# Patient Record
Sex: MALE | Race: BLACK OR AFRICAN AMERICAN | NOT HISPANIC OR LATINO | Employment: FULL TIME | URBAN - METROPOLITAN AREA
[De-identification: names, ages, dates, MRNs, and addresses within clinical notes are randomized per-mention and may not be internally consistent; named-entity substitution may affect disease eponyms.]

---

## 2023-01-21 ENCOUNTER — ANESTHESIA (OUTPATIENT)
Dept: SURGERY | Facility: HOSPITAL | Age: 28
DRG: 481 | End: 2023-01-21
Payer: COMMERCIAL

## 2023-01-21 ENCOUNTER — ANESTHESIA EVENT (OUTPATIENT)
Dept: SURGERY | Facility: HOSPITAL | Age: 28
DRG: 481 | End: 2023-01-21
Payer: COMMERCIAL

## 2023-01-21 ENCOUNTER — HOSPITAL ENCOUNTER (INPATIENT)
Facility: HOSPITAL | Age: 28
LOS: 3 days | Discharge: HOME | DRG: 481 | End: 2023-01-24
Attending: STUDENT IN AN ORGANIZED HEALTH CARE EDUCATION/TRAINING PROGRAM | Admitting: ORTHOPAEDIC SURGERY
Payer: COMMERCIAL
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
## 2023-01-21 DIAGNOSIS — S82.832A CLOSED FRACTURE OF DISTAL END OF LEFT FIBULA, UNSPECIFIED FRACTURE MORPHOLOGY, INITIAL ENCOUNTER: ICD-10-CM

## 2023-01-21 DIAGNOSIS — S82.425A CLOSED NONDISPLACED TRANSVERSE FRACTURE OF SHAFT OF LEFT FIBULA, INITIAL ENCOUNTER: ICD-10-CM

## 2023-01-21 DIAGNOSIS — S82.022B: ICD-10-CM

## 2023-01-21 DIAGNOSIS — Z78.9 ALCOHOL USE: ICD-10-CM

## 2023-01-21 DIAGNOSIS — S72.352A CLOSED DISPLACED COMMINUTED FRACTURE OF SHAFT OF LEFT FEMUR, INITIAL ENCOUNTER: ICD-10-CM

## 2023-01-21 LAB
ABORH RETYPE: NORMAL
ALBUMIN SERPL-MCNC: 3.9 G/DL (ref 3.5–5)
ALBUMIN/GLOB SERPL: 0.9 RATIO (ref 1.1–2)
ALP SERPL-CCNC: 82 UNIT/L (ref 40–150)
ALT SERPL-CCNC: 15 UNIT/L (ref 0–55)
AMPHET UR QL SCN: NEGATIVE
APPEARANCE UR: CLEAR
APTT PPP: 25.8 SECONDS (ref 23.2–33.7)
AST SERPL-CCNC: 31 UNIT/L (ref 5–34)
BACTERIA #/AREA URNS AUTO: NORMAL /HPF
BARBITURATE SCN PRESENT UR: NEGATIVE
BASOPHILS # BLD AUTO: 0.04 X10(3)/MCL (ref 0–0.2)
BASOPHILS NFR BLD AUTO: 0.3 %
BENZODIAZ UR QL SCN: NEGATIVE
BILIRUB UR QL STRIP.AUTO: NEGATIVE MG/DL
BILIRUBIN DIRECT+TOT PNL SERPL-MCNC: 1.5 MG/DL
BUN SERPL-MCNC: 11.1 MG/DL (ref 8.9–20.6)
CALCIUM SERPL-MCNC: 9.3 MG/DL (ref 8.4–10.2)
CANNABINOIDS UR QL SCN: POSITIVE
CHLORIDE SERPL-SCNC: 102 MMOL/L (ref 98–107)
CO2 SERPL-SCNC: 19 MMOL/L (ref 22–29)
COCAINE UR QL SCN: NEGATIVE
COLOR UR AUTO: YELLOW
CREAT SERPL-MCNC: 0.91 MG/DL (ref 0.73–1.18)
EOSINOPHIL # BLD AUTO: 0.02 X10(3)/MCL (ref 0–0.9)
EOSINOPHIL NFR BLD AUTO: 0.1 %
ERYTHROCYTE [DISTWIDTH] IN BLOOD BY AUTOMATED COUNT: 14 % (ref 11.5–17)
ETHANOL SERPL-MCNC: 204 MG/DL
FENTANYL UR QL SCN: POSITIVE
GFR SERPLBLD CREATININE-BSD FMLA CKD-EPI: >60 MLS/MIN/1.73/M2
GLOBULIN SER-MCNC: 4.2 GM/DL (ref 2.4–3.5)
GLUCOSE SERPL-MCNC: 92 MG/DL (ref 74–100)
GLUCOSE UR QL STRIP.AUTO: NEGATIVE MG/DL
GROUP & RH: NORMAL
HCT VFR BLD AUTO: 42.5 % (ref 42–52)
HGB BLD-MCNC: 13.7 GM/DL (ref 14–18)
IMM GRANULOCYTES # BLD AUTO: 0.07 X10(3)/MCL (ref 0–0.04)
IMM GRANULOCYTES NFR BLD AUTO: 0.5 %
INDIRECT COOMBS GEL: NORMAL
INR BLD: 1.02 (ref 0–1.3)
KETONES UR QL STRIP.AUTO: NEGATIVE MG/DL
LACTATE SERPL-SCNC: 2.2 MMOL/L (ref 0.5–2.2)
LACTATE SERPL-SCNC: 2.7 MMOL/L (ref 0.5–2.2)
LACTATE SERPL-SCNC: 4.1 MMOL/L (ref 0.5–2.2)
LEUKOCYTE ESTERASE UR QL STRIP.AUTO: NEGATIVE UNIT/L
LYMPHOCYTES # BLD AUTO: 2.17 X10(3)/MCL (ref 0.6–4.6)
LYMPHOCYTES NFR BLD AUTO: 14.6 %
MCH RBC QN AUTO: 27.9 PG
MCHC RBC AUTO-ENTMCNC: 32.2 MG/DL (ref 33–36)
MCV RBC AUTO: 86.6 FL (ref 80–94)
MDMA UR QL SCN: NEGATIVE
MONOCYTES # BLD AUTO: 1.06 X10(3)/MCL (ref 0.1–1.3)
MONOCYTES NFR BLD AUTO: 7.1 %
NEUTROPHILS # BLD AUTO: 11.51 X10(3)/MCL (ref 2.1–9.2)
NEUTROPHILS NFR BLD AUTO: 77.4 %
NITRITE UR QL STRIP.AUTO: NEGATIVE
NRBC BLD AUTO-RTO: 0 %
OPIATES UR QL SCN: POSITIVE
PCP UR QL: NEGATIVE
PH UR STRIP.AUTO: 5.5 [PH]
PH UR: 5.5 [PH] (ref 3–11)
PLATELET # BLD AUTO: 260 X10(3)/MCL (ref 130–400)
PMV BLD AUTO: 9.9 FL (ref 7.4–10.4)
POTASSIUM SERPL-SCNC: 3.8 MMOL/L (ref 3.5–5.1)
PROT SERPL-MCNC: 8.1 GM/DL (ref 6.4–8.3)
PROT UR QL STRIP.AUTO: NEGATIVE MG/DL
PROTHROMBIN TIME: 13.3 SECONDS (ref 12.5–14.5)
RBC # BLD AUTO: 4.91 X10(6)/MCL (ref 4.7–6.1)
RBC #/AREA URNS AUTO: <5 /HPF
RBC UR QL AUTO: ABNORMAL UNIT/L
SODIUM SERPL-SCNC: 138 MMOL/L (ref 136–145)
SP GR UR STRIP.AUTO: 1.03 (ref 1–1.03)
SPECIMEN OUTDATE: NORMAL
SQUAMOUS #/AREA URNS AUTO: <5 /HPF
UROBILINOGEN UR STRIP-ACNC: 0.2 MG/DL
WBC # SPEC AUTO: 14.9 X10(3)/MCL (ref 4.5–11.5)
WBC #/AREA URNS AUTO: <5 /HPF

## 2023-01-21 PROCEDURE — 37000008 HC ANESTHESIA 1ST 15 MINUTES: Performed by: ORTHOPAEDIC SURGERY

## 2023-01-21 PROCEDURE — 37000009 HC ANESTHESIA EA ADD 15 MINS: Performed by: ORTHOPAEDIC SURGERY

## 2023-01-21 PROCEDURE — 99223 1ST HOSP IP/OBS HIGH 75: CPT | Mod: 57,,, | Performed by: ORTHOPAEDIC SURGERY

## 2023-01-21 PROCEDURE — 36000710: Performed by: ORTHOPAEDIC SURGERY

## 2023-01-21 PROCEDURE — 11012 PR DEBRIDE ASSOC OPEN FX/DISLO SKIN/MUS/BONE: ICD-10-PCS | Mod: 51,,, | Performed by: ORTHOPAEDIC SURGERY

## 2023-01-21 PROCEDURE — 90715 TDAP VACCINE 7 YRS/> IM: CPT | Performed by: STUDENT IN AN ORGANIZED HEALTH CARE EDUCATION/TRAINING PROGRAM

## 2023-01-21 PROCEDURE — 86900 BLOOD TYPING SEROLOGIC ABO: CPT | Performed by: STUDENT IN AN ORGANIZED HEALTH CARE EDUCATION/TRAINING PROGRAM

## 2023-01-21 PROCEDURE — 85730 THROMBOPLASTIN TIME PARTIAL: CPT | Performed by: STUDENT IN AN ORGANIZED HEALTH CARE EDUCATION/TRAINING PROGRAM

## 2023-01-21 PROCEDURE — 63600175 PHARM REV CODE 636 W HCPCS

## 2023-01-21 PROCEDURE — 99223 PR INITIAL HOSPITAL CARE,LEVL III: ICD-10-PCS | Mod: 57,,, | Performed by: ORTHOPAEDIC SURGERY

## 2023-01-21 PROCEDURE — 27524 PR OPEN RX PATELLA FX: ICD-10-PCS | Mod: AS,51,LT, | Performed by: NURSE PRACTITIONER

## 2023-01-21 PROCEDURE — 80307 DRUG TEST PRSMV CHEM ANLYZR: CPT | Performed by: STUDENT IN AN ORGANIZED HEALTH CARE EDUCATION/TRAINING PROGRAM

## 2023-01-21 PROCEDURE — 27506 PR OPEN RX FEMUR FX+INTRAMED ROD: ICD-10-PCS | Mod: AS,LT,, | Performed by: NURSE PRACTITIONER

## 2023-01-21 PROCEDURE — C1713 ANCHOR/SCREW BN/BN,TIS/BN: HCPCS | Performed by: ORTHOPAEDIC SURGERY

## 2023-01-21 PROCEDURE — 25000003 PHARM REV CODE 250: Performed by: NURSE PRACTITIONER

## 2023-01-21 PROCEDURE — 85025 COMPLETE CBC W/AUTO DIFF WBC: CPT | Performed by: STUDENT IN AN ORGANIZED HEALTH CARE EDUCATION/TRAINING PROGRAM

## 2023-01-21 PROCEDURE — G0390 TRAUMA RESPONS W/HOSP CRITI: HCPCS

## 2023-01-21 PROCEDURE — 36000711: Performed by: ORTHOPAEDIC SURGERY

## 2023-01-21 PROCEDURE — C1769 GUIDE WIRE: HCPCS | Performed by: ORTHOPAEDIC SURGERY

## 2023-01-21 PROCEDURE — 90471 IMMUNIZATION ADMIN: CPT | Performed by: STUDENT IN AN ORGANIZED HEALTH CARE EDUCATION/TRAINING PROGRAM

## 2023-01-21 PROCEDURE — 82077 ASSAY SPEC XCP UR&BREATH IA: CPT | Performed by: STUDENT IN AN ORGANIZED HEALTH CARE EDUCATION/TRAINING PROGRAM

## 2023-01-21 PROCEDURE — 63600175 PHARM REV CODE 636 W HCPCS: Performed by: NURSE ANESTHETIST, CERTIFIED REGISTERED

## 2023-01-21 PROCEDURE — 27524 TREAT KNEECAP FRACTURE: CPT | Mod: AS,51,LT, | Performed by: NURSE PRACTITIONER

## 2023-01-21 PROCEDURE — 27524 TREAT KNEECAP FRACTURE: CPT | Mod: 51,LT,, | Performed by: ORTHOPAEDIC SURGERY

## 2023-01-21 PROCEDURE — 27506 TREATMENT OF THIGH FRACTURE: CPT | Mod: AS,LT,, | Performed by: NURSE PRACTITIONER

## 2023-01-21 PROCEDURE — 25000003 PHARM REV CODE 250: Performed by: STUDENT IN AN ORGANIZED HEALTH CARE EDUCATION/TRAINING PROGRAM

## 2023-01-21 PROCEDURE — 27506 TREATMENT OF THIGH FRACTURE: CPT | Mod: LT,,, | Performed by: ORTHOPAEDIC SURGERY

## 2023-01-21 PROCEDURE — 81001 URINALYSIS AUTO W/SCOPE: CPT | Performed by: STUDENT IN AN ORGANIZED HEALTH CARE EDUCATION/TRAINING PROGRAM

## 2023-01-21 PROCEDURE — 25500020 PHARM REV CODE 255: Performed by: STUDENT IN AN ORGANIZED HEALTH CARE EDUCATION/TRAINING PROGRAM

## 2023-01-21 PROCEDURE — 63600175 PHARM REV CODE 636 W HCPCS: Performed by: STUDENT IN AN ORGANIZED HEALTH CARE EDUCATION/TRAINING PROGRAM

## 2023-01-21 PROCEDURE — 25000003 PHARM REV CODE 250: Performed by: NURSE ANESTHETIST, CERTIFIED REGISTERED

## 2023-01-21 PROCEDURE — 85610 PROTHROMBIN TIME: CPT | Performed by: STUDENT IN AN ORGANIZED HEALTH CARE EDUCATION/TRAINING PROGRAM

## 2023-01-21 PROCEDURE — 71000039 HC RECOVERY, EACH ADD'L HOUR: Performed by: ORTHOPAEDIC SURGERY

## 2023-01-21 PROCEDURE — 83605 ASSAY OF LACTIC ACID: CPT | Performed by: STUDENT IN AN ORGANIZED HEALTH CARE EDUCATION/TRAINING PROGRAM

## 2023-01-21 PROCEDURE — 94799 UNLISTED PULMONARY SVC/PX: CPT

## 2023-01-21 PROCEDURE — 27201423 OPTIME MED/SURG SUP & DEVICES STERILE SUPPLY: Performed by: ORTHOPAEDIC SURGERY

## 2023-01-21 PROCEDURE — 27506 PR OPEN RX FEMUR FX+INTRAMED ROD: ICD-10-PCS | Mod: LT,,, | Performed by: ORTHOPAEDIC SURGERY

## 2023-01-21 PROCEDURE — 80053 COMPREHEN METABOLIC PANEL: CPT | Performed by: STUDENT IN AN ORGANIZED HEALTH CARE EDUCATION/TRAINING PROGRAM

## 2023-01-21 PROCEDURE — 63600175 PHARM REV CODE 636 W HCPCS: Performed by: ORTHOPAEDIC SURGERY

## 2023-01-21 PROCEDURE — 96375 TX/PRO/DX INJ NEW DRUG ADDON: CPT

## 2023-01-21 PROCEDURE — 71000033 HC RECOVERY, INTIAL HOUR: Performed by: ORTHOPAEDIC SURGERY

## 2023-01-21 PROCEDURE — 96374 THER/PROPH/DIAG INJ IV PUSH: CPT

## 2023-01-21 PROCEDURE — 27524 PR OPEN RX PATELLA FX: ICD-10-PCS | Mod: 51,LT,, | Performed by: ORTHOPAEDIC SURGERY

## 2023-01-21 PROCEDURE — 63600175 PHARM REV CODE 636 W HCPCS: Performed by: ANESTHESIOLOGY

## 2023-01-21 PROCEDURE — 63600175 PHARM REV CODE 636 W HCPCS: Performed by: NURSE PRACTITIONER

## 2023-01-21 PROCEDURE — 99285 EMERGENCY DEPT VISIT HI MDM: CPT | Mod: 25

## 2023-01-21 PROCEDURE — 11012 DEB SKIN BONE AT FX SITE: CPT | Mod: 51,,, | Performed by: ORTHOPAEDIC SURGERY

## 2023-01-21 PROCEDURE — 11000001 HC ACUTE MED/SURG PRIVATE ROOM

## 2023-01-21 DEVICE — IMPLANTABLE DEVICE: Type: IMPLANTABLE DEVICE | Site: FEMUR | Status: FUNCTIONAL

## 2023-01-21 DEVICE — SCREW LOCKING BONE T2 5X47MM: Type: IMPLANTABLE DEVICE | Site: FEMUR | Status: FUNCTIONAL

## 2023-01-21 DEVICE — SCREW LOCKING BONE T2 5X42MM: Type: IMPLANTABLE DEVICE | Site: FEMUR | Status: FUNCTIONAL

## 2023-01-21 RX ORDER — MORPHINE SULFATE 4 MG/ML
2 INJECTION, SOLUTION INTRAMUSCULAR; INTRAVENOUS EVERY 4 HOURS PRN
Status: DISCONTINUED | OUTPATIENT
Start: 2023-01-21 | End: 2023-01-24 | Stop reason: HOSPADM

## 2023-01-21 RX ORDER — MIDAZOLAM HYDROCHLORIDE 1 MG/ML
INJECTION INTRAMUSCULAR; INTRAVENOUS
Status: DISCONTINUED | OUTPATIENT
Start: 2023-01-21 | End: 2023-01-21

## 2023-01-21 RX ORDER — ROCURONIUM BROMIDE 10 MG/ML
INJECTION, SOLUTION INTRAVENOUS
Status: DISCONTINUED | OUTPATIENT
Start: 2023-01-21 | End: 2023-01-21

## 2023-01-21 RX ORDER — PROPOFOL 10 MG/ML
VIAL (ML) INTRAVENOUS CODE/TRAUMA/SEDATION MEDICATION
Status: DISCONTINUED | OUTPATIENT
Start: 2023-01-21 | End: 2023-01-24 | Stop reason: HOSPADM

## 2023-01-21 RX ORDER — MAG HYDROX/ALUMINUM HYD/SIMETH 200-200-20
30 SUSPENSION, ORAL (FINAL DOSE FORM) ORAL EVERY 6 HOURS PRN
Status: DISCONTINUED | OUTPATIENT
Start: 2023-01-21 | End: 2023-01-24 | Stop reason: HOSPADM

## 2023-01-21 RX ORDER — PROPOFOL 10 MG/ML
VIAL (ML) INTRAVENOUS
Status: DISCONTINUED | OUTPATIENT
Start: 2023-01-21 | End: 2023-01-21

## 2023-01-21 RX ORDER — DIPHENHYDRAMINE HYDROCHLORIDE 50 MG/ML
25 INJECTION INTRAMUSCULAR; INTRAVENOUS EVERY 6 HOURS PRN
Status: DISCONTINUED | OUTPATIENT
Start: 2023-01-21 | End: 2023-01-22

## 2023-01-21 RX ORDER — SODIUM CHLORIDE, SODIUM LACTATE, POTASSIUM CHLORIDE, CALCIUM CHLORIDE 600; 310; 30; 20 MG/100ML; MG/100ML; MG/100ML; MG/100ML
INJECTION, SOLUTION INTRAVENOUS
Status: DISCONTINUED | OUTPATIENT
Start: 2023-01-21 | End: 2023-01-24 | Stop reason: HOSPADM

## 2023-01-21 RX ORDER — ONDANSETRON 2 MG/ML
INJECTION INTRAMUSCULAR; INTRAVENOUS
Status: COMPLETED
Start: 2023-01-21 | End: 2023-01-21

## 2023-01-21 RX ORDER — ONDANSETRON 2 MG/ML
4 INJECTION INTRAMUSCULAR; INTRAVENOUS ONCE
Status: DISCONTINUED | OUTPATIENT
Start: 2023-01-21 | End: 2023-01-24 | Stop reason: HOSPADM

## 2023-01-21 RX ORDER — CEFAZOLIN SODIUM 1 G/3ML
INJECTION, POWDER, FOR SOLUTION INTRAMUSCULAR; INTRAVENOUS
Status: DISCONTINUED | OUTPATIENT
Start: 2023-01-21 | End: 2023-01-21

## 2023-01-21 RX ORDER — METHOCARBAMOL 100 MG/ML
1000 INJECTION, SOLUTION INTRAMUSCULAR; INTRAVENOUS
Status: COMPLETED | OUTPATIENT
Start: 2023-01-21 | End: 2023-01-21

## 2023-01-21 RX ORDER — CEFAZOLIN SODIUM 1 G/3ML
INJECTION, POWDER, FOR SOLUTION INTRAMUSCULAR; INTRAVENOUS
Status: COMPLETED
Start: 2023-01-21 | End: 2023-01-21

## 2023-01-21 RX ORDER — MORPHINE SULFATE 4 MG/ML
4 INJECTION, SOLUTION INTRAMUSCULAR; INTRAVENOUS EVERY 4 HOURS PRN
Status: DISCONTINUED | OUTPATIENT
Start: 2023-01-21 | End: 2023-01-24 | Stop reason: HOSPADM

## 2023-01-21 RX ORDER — ONDANSETRON HYDROCHLORIDE 4 MG/5ML
4 SOLUTION ORAL EVERY 6 HOURS PRN
Status: DISCONTINUED | OUTPATIENT
Start: 2023-01-21 | End: 2023-01-24 | Stop reason: HOSPADM

## 2023-01-21 RX ORDER — CEFAZOLIN SODIUM 2 G/50ML
SOLUTION INTRAVENOUS
Status: DISPENSED
Start: 2023-01-21 | End: 2023-01-21

## 2023-01-21 RX ORDER — SODIUM CHLORIDE 0.9 % (FLUSH) 0.9 %
10 SYRINGE (ML) INJECTION
Status: DISCONTINUED | OUTPATIENT
Start: 2023-01-21 | End: 2023-01-24 | Stop reason: HOSPADM

## 2023-01-21 RX ORDER — HYDROMORPHONE HYDROCHLORIDE 2 MG/ML
0.2 INJECTION, SOLUTION INTRAMUSCULAR; INTRAVENOUS; SUBCUTANEOUS EVERY 5 MIN PRN
Status: DISCONTINUED | OUTPATIENT
Start: 2023-01-21 | End: 2023-01-22

## 2023-01-21 RX ORDER — MEPERIDINE HYDROCHLORIDE 25 MG/ML
12.5 INJECTION INTRAMUSCULAR; INTRAVENOUS; SUBCUTANEOUS ONCE
Status: COMPLETED | OUTPATIENT
Start: 2023-01-21 | End: 2023-01-21

## 2023-01-21 RX ORDER — ONDANSETRON 2 MG/ML
INJECTION INTRAMUSCULAR; INTRAVENOUS
Status: DISCONTINUED | OUTPATIENT
Start: 2023-01-21 | End: 2023-01-21

## 2023-01-21 RX ORDER — HYDROMORPHONE HYDROCHLORIDE 2 MG/ML
0.5 INJECTION, SOLUTION INTRAMUSCULAR; INTRAVENOUS; SUBCUTANEOUS EVERY 5 MIN PRN
Status: DISCONTINUED | OUTPATIENT
Start: 2023-01-21 | End: 2023-01-22

## 2023-01-21 RX ORDER — TALC
6 POWDER (GRAM) TOPICAL NIGHTLY PRN
Status: DISCONTINUED | OUTPATIENT
Start: 2023-01-21 | End: 2023-01-24 | Stop reason: HOSPADM

## 2023-01-21 RX ORDER — BISACODYL 10 MG
10 SUPPOSITORY, RECTAL RECTAL DAILY PRN
Status: DISCONTINUED | OUTPATIENT
Start: 2023-01-21 | End: 2023-01-24 | Stop reason: HOSPADM

## 2023-01-21 RX ORDER — DOCUSATE SODIUM 100 MG/1
100 CAPSULE, LIQUID FILLED ORAL 2 TIMES DAILY
Status: DISCONTINUED | OUTPATIENT
Start: 2023-01-21 | End: 2023-01-24 | Stop reason: HOSPADM

## 2023-01-21 RX ORDER — SODIUM CHLORIDE, SODIUM LACTATE, POTASSIUM CHLORIDE, CALCIUM CHLORIDE 600; 310; 30; 20 MG/100ML; MG/100ML; MG/100ML; MG/100ML
INJECTION, SOLUTION INTRAVENOUS
Status: COMPLETED | OUTPATIENT
Start: 2023-01-21 | End: 2023-01-21

## 2023-01-21 RX ORDER — ONDANSETRON 2 MG/ML
4 INJECTION INTRAMUSCULAR; INTRAVENOUS
Status: COMPLETED | OUTPATIENT
Start: 2023-01-21 | End: 2023-01-21

## 2023-01-21 RX ORDER — OXYCODONE AND ACETAMINOPHEN 10; 325 MG/1; MG/1
1 TABLET ORAL EVERY 4 HOURS PRN
Status: DISCONTINUED | OUTPATIENT
Start: 2023-01-21 | End: 2023-01-24 | Stop reason: HOSPADM

## 2023-01-21 RX ORDER — KETOROLAC TROMETHAMINE 30 MG/ML
INJECTION, SOLUTION INTRAMUSCULAR; INTRAVENOUS
Status: DISCONTINUED | OUTPATIENT
Start: 2023-01-21 | End: 2023-01-21

## 2023-01-21 RX ORDER — DIPHENHYDRAMINE HCL 25 MG
25 CAPSULE ORAL EVERY 6 HOURS PRN
Status: DISCONTINUED | OUTPATIENT
Start: 2023-01-21 | End: 2023-01-24 | Stop reason: HOSPADM

## 2023-01-21 RX ORDER — METHOCARBAMOL 100 MG/ML
1000 INJECTION, SOLUTION INTRAMUSCULAR; INTRAVENOUS EVERY 8 HOURS
Status: DISCONTINUED | OUTPATIENT
Start: 2023-01-21 | End: 2023-01-24 | Stop reason: HOSPADM

## 2023-01-21 RX ORDER — KETOROLAC TROMETHAMINE 30 MG/ML
15 INJECTION, SOLUTION INTRAMUSCULAR; INTRAVENOUS EVERY 6 HOURS PRN
Status: DISPENSED | OUTPATIENT
Start: 2023-01-21 | End: 2023-01-24

## 2023-01-21 RX ORDER — CEFAZOLIN SODIUM 2 G/50ML
2 SOLUTION INTRAVENOUS
Status: COMPLETED | OUTPATIENT
Start: 2023-01-21 | End: 2023-01-22

## 2023-01-21 RX ORDER — ACETAMINOPHEN 325 MG/1
650 TABLET ORAL EVERY 6 HOURS PRN
Status: DISCONTINUED | OUTPATIENT
Start: 2023-01-21 | End: 2023-01-24 | Stop reason: HOSPADM

## 2023-01-21 RX ORDER — POLYETHYLENE GLYCOL 3350 17 G/17G
17 POWDER, FOR SOLUTION ORAL DAILY PRN
Status: DISCONTINUED | OUTPATIENT
Start: 2023-01-21 | End: 2023-01-24 | Stop reason: HOSPADM

## 2023-01-21 RX ORDER — PROPOFOL 10 MG/ML
VIAL (ML) INTRAVENOUS
Status: DISPENSED
Start: 2023-01-21 | End: 2023-01-21

## 2023-01-21 RX ORDER — OXYCODONE AND ACETAMINOPHEN 5; 325 MG/1; MG/1
1 TABLET ORAL EVERY 4 HOURS PRN
Status: DISCONTINUED | OUTPATIENT
Start: 2023-01-21 | End: 2023-01-24 | Stop reason: HOSPADM

## 2023-01-21 RX ORDER — MORPHINE SULFATE 4 MG/ML
4 INJECTION, SOLUTION INTRAMUSCULAR; INTRAVENOUS
Status: COMPLETED | OUTPATIENT
Start: 2023-01-21 | End: 2023-01-21

## 2023-01-21 RX ORDER — FENTANYL CITRATE 50 UG/ML
INJECTION, SOLUTION INTRAMUSCULAR; INTRAVENOUS
Status: DISCONTINUED | OUTPATIENT
Start: 2023-01-21 | End: 2023-01-21

## 2023-01-21 RX ORDER — SODIUM CHLORIDE 9 MG/ML
INJECTION, SOLUTION INTRAVENOUS CONTINUOUS
Status: DISCONTINUED | OUTPATIENT
Start: 2023-01-21 | End: 2023-01-24 | Stop reason: HOSPADM

## 2023-01-21 RX ORDER — DEXAMETHASONE SODIUM PHOSPHATE 4 MG/ML
INJECTION, SOLUTION INTRA-ARTICULAR; INTRALESIONAL; INTRAMUSCULAR; INTRAVENOUS; SOFT TISSUE
Status: DISCONTINUED | OUTPATIENT
Start: 2023-01-21 | End: 2023-01-21

## 2023-01-21 RX ORDER — VANCOMYCIN HYDROCHLORIDE 1 G/20ML
INJECTION, POWDER, LYOPHILIZED, FOR SOLUTION INTRAVENOUS
Status: DISCONTINUED | OUTPATIENT
Start: 2023-01-21 | End: 2023-01-21 | Stop reason: HOSPADM

## 2023-01-21 RX ORDER — ACETAMINOPHEN 500 MG
1000 TABLET ORAL
Status: COMPLETED | OUTPATIENT
Start: 2023-01-21 | End: 2023-01-21

## 2023-01-21 RX ORDER — CEFAZOLIN SODIUM 2 G/50ML
SOLUTION INTRAVENOUS
Status: COMPLETED | OUTPATIENT
Start: 2023-01-21 | End: 2023-01-21

## 2023-01-21 RX ADMIN — SODIUM CHLORIDE, POTASSIUM CHLORIDE, SODIUM LACTATE AND CALCIUM CHLORIDE 1000 ML: 600; 310; 30; 20 INJECTION, SOLUTION INTRAVENOUS at 04:01

## 2023-01-21 RX ADMIN — KETOROLAC TROMETHAMINE 30 MG: 30 INJECTION, SOLUTION INTRAMUSCULAR at 12:01

## 2023-01-21 RX ADMIN — MEPERIDINE HYDROCHLORIDE 12.5 MG: 25 INJECTION INTRAMUSCULAR; INTRAVENOUS; SUBCUTANEOUS at 01:01

## 2023-01-21 RX ADMIN — MORPHINE SULFATE 4 MG: 4 INJECTION INTRAVENOUS at 05:01

## 2023-01-21 RX ADMIN — CEFAZOLIN 2 G: 330 INJECTION, POWDER, FOR SOLUTION INTRAMUSCULAR; INTRAVENOUS at 11:01

## 2023-01-21 RX ADMIN — SODIUM CHLORIDE, POTASSIUM CHLORIDE, SODIUM LACTATE AND CALCIUM CHLORIDE 1000 ML: 600; 310; 30; 20 INJECTION, SOLUTION INTRAVENOUS at 06:01

## 2023-01-21 RX ADMIN — ACETAMINOPHEN 1000 MG: 500 TABLET, FILM COATED ORAL at 05:01

## 2023-01-21 RX ADMIN — SODIUM CHLORIDE: 9 INJECTION, SOLUTION INTRAVENOUS at 03:01

## 2023-01-21 RX ADMIN — OXYCODONE AND ACETAMINOPHEN 1 TABLET: 10; 325 TABLET ORAL at 09:01

## 2023-01-21 RX ADMIN — METHOCARBAMOL 1000 MG: 100 INJECTION, SOLUTION INTRAMUSCULAR; INTRAVENOUS at 09:01

## 2023-01-21 RX ADMIN — PROPOFOL 150 MG: 10 INJECTION, EMULSION INTRAVENOUS at 11:01

## 2023-01-21 RX ADMIN — ONDANSETRON: 2 INJECTION INTRAMUSCULAR; INTRAVENOUS at 04:01

## 2023-01-21 RX ADMIN — CEFAZOLIN SODIUM 2 G: 2 SOLUTION INTRAVENOUS at 04:01

## 2023-01-21 RX ADMIN — CEFAZOLIN SODIUM 2 G: 2 SOLUTION INTRAVENOUS at 06:01

## 2023-01-21 RX ADMIN — SUGAMMADEX 200 MG: 100 INJECTION, SOLUTION INTRAVENOUS at 12:01

## 2023-01-21 RX ADMIN — METHOCARBAMOL 1000 MG: 100 INJECTION, SOLUTION INTRAMUSCULAR; INTRAVENOUS at 02:01

## 2023-01-21 RX ADMIN — IOPAMIDOL 100 ML: 755 INJECTION, SOLUTION INTRAVENOUS at 05:01

## 2023-01-21 RX ADMIN — OXYCODONE AND ACETAMINOPHEN 1 TABLET: 10; 325 TABLET ORAL at 06:01

## 2023-01-21 RX ADMIN — PROPOFOL 50 MG: 10 INJECTION, EMULSION INTRAVENOUS at 11:01

## 2023-01-21 RX ADMIN — SODIUM CHLORIDE, SODIUM GLUCONATE, SODIUM ACETATE, POTASSIUM CHLORIDE AND MAGNESIUM CHLORIDE: 526; 502; 368; 37; 30 INJECTION, SOLUTION INTRAVENOUS at 11:01

## 2023-01-21 RX ADMIN — ONDANSETRON 4 MG: 2 INJECTION INTRAMUSCULAR; INTRAVENOUS at 12:01

## 2023-01-21 RX ADMIN — METHOCARBAMOL 1000 MG: 100 INJECTION, SOLUTION INTRAMUSCULAR; INTRAVENOUS at 05:01

## 2023-01-21 RX ADMIN — DEXAMETHASONE SODIUM PHOSPHATE 4 MG: 4 INJECTION, SOLUTION INTRA-ARTICULAR; INTRALESIONAL; INTRAMUSCULAR; INTRAVENOUS; SOFT TISSUE at 11:01

## 2023-01-21 RX ADMIN — KETOROLAC TROMETHAMINE 15 MG: 30 INJECTION, SOLUTION INTRAMUSCULAR; INTRAVENOUS at 08:01

## 2023-01-21 RX ADMIN — TETANUS TOXOID, REDUCED DIPHTHERIA TOXOID AND ACELLULAR PERTUSSIS VACCINE, ADSORBED 0.5 ML: 5; 2.5; 8; 8; 2.5 SUSPENSION INTRAMUSCULAR at 04:01

## 2023-01-21 RX ADMIN — ROCURONIUM BROMIDE 50 MG: 10 INJECTION, SOLUTION INTRAVENOUS at 11:01

## 2023-01-21 RX ADMIN — DOCUSATE SODIUM 100 MG: 100 CAPSULE, LIQUID FILLED ORAL at 09:01

## 2023-01-21 RX ADMIN — PROPOFOL 50 MG: 10 INJECTION, EMULSION INTRAVENOUS at 04:01

## 2023-01-21 RX ADMIN — MORPHINE SULFATE 4 MG: 4 INJECTION INTRAVENOUS at 08:01

## 2023-01-21 RX ADMIN — MIDAZOLAM HYDROCHLORIDE 2 MG: 1 INJECTION, SOLUTION INTRAMUSCULAR; INTRAVENOUS at 11:01

## 2023-01-21 RX ADMIN — FENTANYL CITRATE 100 MCG: 50 INJECTION, SOLUTION INTRAMUSCULAR; INTRAVENOUS at 11:01

## 2023-01-21 NOTE — TRANSFER OF CARE
"Anesthesia Transfer of Care Note    Patient: William Yang    Procedure(s) Performed: Procedure(s) (LRB):  INSERTION, ANTEGRADE INTRAMEDULLARY VANESSA, LEFT FEMUR (Left)    Patient location: PACU    Anesthesia Type: general    Transport from OR: Transported from OR on room air with adequate spontaneous ventilation    Post pain: adequate analgesia    Post assessment: no apparent anesthetic complications and tolerated procedure well    Post vital signs: stable    Level of consciousness: lethargic    Nausea/Vomiting: no nausea/vomiting    Complications: none    Transfer of care protocol was followed      Last vitals:   Visit Vitals  /74   Pulse 97   Temp 36.8 °C (98.2 °F) (Oral)   Resp 15   Ht 5' 5" (1.651 m)   Wt 58.1 kg (128 lb)   SpO2 97%   BMI 21.30 kg/m²     "
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
## 2023-01-21 NOTE — ANESTHESIA PROCEDURE NOTES
Intubation    Date/Time: 1/21/2023 11:05 AM  Performed by: Mian Cortez CRNA  Authorized by: Marjorie Carrillo MD     Intubation:     Induction:  Intravenous    Intubated:  Postinduction    Mask Ventilation:  Easy mask    Attempts:  1    Attempted By:  CRNA    Method of Intubation:  Direct    Blade:  Gomez 2    Laryngeal View Grade: Grade I - full view of cords      Difficult Airway Encountered?: No      Complications:  None    Airway Device:  Oral endotracheal tube    Airway Device Size:  7.5    Style/Cuff Inflation:  Cuffed (inflated to minimal occlusive pressure)    Inflation Amount (mL):  9    Tube secured:  22    Secured at:  The lips    Placement Verified By:  Capnometry    Complicating Factors:  None    Findings Post-Intubation:  BS equal bilateral and atraumatic/condition of teeth unchanged
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
## 2023-01-21 NOTE — BRIEF OP NOTE
[facility] - Periop Services  Brief Operative Note    SUMMARY     Surgery Date: 1/21/2023     Surgeon(s) and Role:     * Delgado Rasmussen MD - Primary     * MALA Sarkar - Assisting        Pre-op Diagnosis:  Closed displaced comminuted fracture of shaft of left femur, initial encounter [S72.352A]  Left open patella fracture      Post-op Diagnosis:  Post-Op Diagnosis Codes:     * Closed displaced comminuted fracture of shaft of left femur, initial encounter [S72.352A]  Left open patella fracture    Procedure(s) (LRB):  INSERTION, ANTEGRADE INTRAMEDULLARY VANESSA, LEFT FEMUR (Left)-22507  ORIF L patella-09786  I&D open fx including bone- 02860    Anesthesia: General    Operative Findings: see op report    Estimated Blood Loss: 50 mL    Estimated Blood Loss has been documented.         Specimens:   Specimen (24h ago, onward)      None            [id number]    A/P: Tolerated procedure well. Admit to floor. WBAT LLE with crutches. Lovenox for DVT ppx. Ancef for 24hr.      Delgado Rasmussen MD  Orthopedic Trauma  [facility]     Abdominal pain Abdominal Pain, N/V/D Abdominal Pain
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
## 2023-01-21 NOTE — NURSING
Nurses Note -- 4 Eyes      1/21/2023   5:06 PM      Skin assessed during: Admit      [x] No Pressure Injuries Present    []Prevention Measures Documented      [] Yes- Altered Skin Integrity Present or Discovered   [] LDA Added if Not in Epic (Describe Wound)   [] New Altered Skin Integrity was Present on Admit and Documented in LDA   [] Wound Image Taken    Wound Care Consulted? No    Attending Nurse:  Luanne Gaitan RN     Second RN/Staff Member:  Mindy Barrera RN  .e

## 2023-01-22 LAB
ANION GAP SERPL CALC-SCNC: 7 MEQ/L
BASOPHILS # BLD AUTO: 0.01 X10(3)/MCL (ref 0–0.2)
BASOPHILS NFR BLD AUTO: 0.1 %
BUN SERPL-MCNC: 12.3 MG/DL (ref 8.9–20.6)
CALCIUM SERPL-MCNC: 7.9 MG/DL (ref 8.4–10.2)
CHLORIDE SERPL-SCNC: 103 MMOL/L (ref 98–107)
CO2 SERPL-SCNC: 24 MMOL/L (ref 22–29)
CREAT SERPL-MCNC: 0.96 MG/DL (ref 0.73–1.18)
CREAT/UREA NIT SERPL: 13
EOSINOPHIL # BLD AUTO: 0 X10(3)/MCL (ref 0–0.9)
EOSINOPHIL NFR BLD AUTO: 0 %
ERYTHROCYTE [DISTWIDTH] IN BLOOD BY AUTOMATED COUNT: 14.3 % (ref 11.5–17)
GFR SERPLBLD CREATININE-BSD FMLA CKD-EPI: >60 MLS/MIN/1.73/M2
GLUCOSE SERPL-MCNC: 105 MG/DL (ref 74–100)
HCT VFR BLD AUTO: 24.4 % (ref 42–52)
HGB BLD-MCNC: 8.1 GM/DL (ref 14–18)
IMM GRANULOCYTES # BLD AUTO: 0.03 X10(3)/MCL (ref 0–0.04)
IMM GRANULOCYTES NFR BLD AUTO: 0.3 %
LYMPHOCYTES # BLD AUTO: 1.9 X10(3)/MCL (ref 0.6–4.6)
LYMPHOCYTES NFR BLD AUTO: 19.1 %
MCH RBC QN AUTO: 28.6 PG
MCHC RBC AUTO-ENTMCNC: 33.2 MG/DL (ref 33–36)
MCV RBC AUTO: 86.2 FL (ref 80–94)
MONOCYTES # BLD AUTO: 1.54 X10(3)/MCL (ref 0.1–1.3)
MONOCYTES NFR BLD AUTO: 15.5 %
NEUTROPHILS # BLD AUTO: 6.46 X10(3)/MCL (ref 2.1–9.2)
NEUTROPHILS NFR BLD AUTO: 65 %
NRBC BLD AUTO-RTO: 0 %
PLATELET # BLD AUTO: 237 X10(3)/MCL (ref 130–400)
PMV BLD AUTO: 8.7 FL (ref 7.4–10.4)
POTASSIUM SERPL-SCNC: 4.1 MMOL/L (ref 3.5–5.1)
RBC # BLD AUTO: 2.83 X10(6)/MCL (ref 4.7–6.1)
SODIUM SERPL-SCNC: 134 MMOL/L (ref 136–145)
WBC # SPEC AUTO: 9.9 X10(3)/MCL (ref 4.5–11.5)

## 2023-01-22 PROCEDURE — 80048 BASIC METABOLIC PNL TOTAL CA: CPT | Performed by: NURSE PRACTITIONER

## 2023-01-22 PROCEDURE — 63600175 PHARM REV CODE 636 W HCPCS: Performed by: NURSE PRACTITIONER

## 2023-01-22 PROCEDURE — 36415 COLL VENOUS BLD VENIPUNCTURE: CPT | Performed by: NURSE PRACTITIONER

## 2023-01-22 PROCEDURE — 97161 PT EVAL LOW COMPLEX 20 MIN: CPT

## 2023-01-22 PROCEDURE — 63600175 PHARM REV CODE 636 W HCPCS: Performed by: STUDENT IN AN ORGANIZED HEALTH CARE EDUCATION/TRAINING PROGRAM

## 2023-01-22 PROCEDURE — 97166 OT EVAL MOD COMPLEX 45 MIN: CPT

## 2023-01-22 PROCEDURE — 99900035 HC TECH TIME PER 15 MIN (STAT)

## 2023-01-22 PROCEDURE — 85025 COMPLETE CBC W/AUTO DIFF WBC: CPT | Performed by: NURSE PRACTITIONER

## 2023-01-22 PROCEDURE — 25000003 PHARM REV CODE 250: Performed by: NURSE PRACTITIONER

## 2023-01-22 PROCEDURE — 11000001 HC ACUTE MED/SURG PRIVATE ROOM

## 2023-01-22 PROCEDURE — 94799 UNLISTED PULMONARY SVC/PX: CPT

## 2023-01-22 RX ADMIN — METHOCARBAMOL 1000 MG: 100 INJECTION, SOLUTION INTRAMUSCULAR; INTRAVENOUS at 08:01

## 2023-01-22 RX ADMIN — METHOCARBAMOL 1000 MG: 100 INJECTION, SOLUTION INTRAMUSCULAR; INTRAVENOUS at 06:01

## 2023-01-22 RX ADMIN — OXYCODONE AND ACETAMINOPHEN 1 TABLET: 10; 325 TABLET ORAL at 06:01

## 2023-01-22 RX ADMIN — DOCUSATE SODIUM 100 MG: 100 CAPSULE, LIQUID FILLED ORAL at 09:01

## 2023-01-22 RX ADMIN — MORPHINE SULFATE 2 MG: 4 INJECTION INTRAVENOUS at 12:01

## 2023-01-22 RX ADMIN — OXYCODONE AND ACETAMINOPHEN 1 TABLET: 10; 325 TABLET ORAL at 02:01

## 2023-01-22 RX ADMIN — OXYCODONE HYDROCHLORIDE AND ACETAMINOPHEN 1 TABLET: 5; 325 TABLET ORAL at 02:01

## 2023-01-22 RX ADMIN — CEFAZOLIN SODIUM 2 G: 2 SOLUTION INTRAVENOUS at 02:01

## 2023-01-22 RX ADMIN — CEFAZOLIN SODIUM 2 G: 2 SOLUTION INTRAVENOUS at 11:01

## 2023-01-22 RX ADMIN — OXYCODONE AND ACETAMINOPHEN 1 TABLET: 10; 325 TABLET ORAL at 09:01

## 2023-01-22 RX ADMIN — OXYCODONE AND ACETAMINOPHEN 1 TABLET: 10; 325 TABLET ORAL at 08:01

## 2023-01-22 RX ADMIN — DOCUSATE SODIUM 100 MG: 100 CAPSULE, LIQUID FILLED ORAL at 08:01

## 2023-01-22 RX ADMIN — MORPHINE SULFATE 2 MG: 4 INJECTION INTRAVENOUS at 05:01

## 2023-01-22 RX ADMIN — METHOCARBAMOL 1000 MG: 100 INJECTION, SOLUTION INTRAMUSCULAR; INTRAVENOUS at 02:01
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
## 2023-01-22 NOTE — PLAN OF CARE
Problem: Physical Therapy  Goal: Physical Therapy Goal  Description: Pt will be seen 5x/week for 1 week to work on the following goals 1. Sit to stand with rw with sba 2 amb sba 150ft with rw WBAT LLE    1/22/2023 0931 by Blake Thomas, PT  Outcome: Ongoing, Progressing  1/22/2023 0929 by Blake Thomas, PT  Outcome: Ongoing, Progressing

## 2023-01-22 NOTE — PT/OT/SLP EVAL
Physical Therapy Evaluation    Patient Name:  William Yang   MRN:  [id number]    Recommendations:     Discharge Recommendations: home   Discharge Equipment Recommendations: walker, rolling   Barriers to discharge: None    Assessment:     William Yang is a 27 y.o. male admitted with a medical diagnosis of Closed displaced comminuted fracture of shaft of left femur.  He presents with the following impairments/functional limitations: pain, impaired functional mobility     Rehab Prognosis: Good; patient would benefit from acute skilled PT services to address these deficits and reach maximum level of function.    Recent Surgery: Procedure(s) (LRB):  INSERTION, ANTEGRADE INTRAMEDULLARY VANESSA, LEFT FEMUR (Left) 1 Day Post-Op    Plan:     During this hospitalization, patient to be seen 5 x/week to address the identified rehab impairments via gait training, therapeutic activities and progress toward the following goals:    Plan of Care Expires:  01/29/23    Subjective     Chief Complaint: left leg pain  Patient/Family Comments/goals:  Pain/Comfort:  Pain Rating 1: 10/10  Location - Side 1: Left  Location 1: leg    Patients cultural, spiritual, Adventism conflicts given the current situation:      Living Environment:  pt lives with family in a house with 5 steps in front and one step in back . Pt states he will go up the one step in the back.  Prior to admission, patients level of function was ind.  Equipment used at home: none.  DME owned (not currently used):   Upon discharge, patient will have assistance from family    Objective:     Communicated with nurse prior to session.  Patient found up in chair with    upon PT entry to room.    General Precautions: Standard,    Orthopedic Precautions:LLE weight bearing as tolerated   Braces:    Respiratory Status: Room air    Exams:  RUE ROM: WFL  RUE Strength: WFL  LUE ROM: WFL  LUE Strength: WFL  RLE ROM: WFL  RLE Strength: WFL  LLE ROM: not tested  LLE Strength: not  tested    Functional Mobility:  Transfers:     Sit to Stand:  contact guard assistance with rolling walker  Bed to Chair: contact guard assistance with  rolling walker  using  Step Transfer  Gait: pt practiced with crutches but was much more comfortable using a rw. Pt amb with rw with WBAT LLE 20ft with cga      AM-PAC 6 CLICK MOBILITY  Total Score:        Treatment & Education:  Pt taught crutch and RW ambulation and use of steps with an AD    Patient left up in chair with all lines intact and call button in reach.    GOALS:   Multidisciplinary Problems       Physical Therapy Goals          Problem: Physical Therapy    Goal Priority Disciplines Outcome Goal Variances Interventions   Physical Therapy Goal     PT, PT/OT Ongoing, Progressing     Description: Pt will be seen 5x/week for 1 week to work on the following goals 1. Sit to stand with rw with sba 2 amb sba 150ft with rw WBAT LLE                         History:     No past medical history on file.    No past surgical history on file.    Time Tracking:     PT Received On:    PT Start Time: 0905     PT Stop Time: 0925  PT Total Time (min): 20 min     Billable Minutes: Evaluation 20 01/22/2023

## 2023-01-22 NOTE — PLAN OF CARE
Problem: Occupational Therapy  Goal: Occupational Therapy Goal  Description: 1. Pt will ambulate to bathroom with RW mod I  2. Pt will complete LE dressing with AE as needed.  3. Pt will complete toilet t/f mod I.  4. Pt will complete toileting tasks mod I  5. Pt will complete grooming in standing at sink mod I  Outcome: Ongoing, Progressing

## 2023-01-23 LAB
ANION GAP SERPL CALC-SCNC: 8 MEQ/L
BASOPHILS # BLD AUTO: 0.03 X10(3)/MCL (ref 0–0.2)
BASOPHILS NFR BLD AUTO: 0.3 %
BUN SERPL-MCNC: 6.9 MG/DL (ref 8.9–20.6)
CALCIUM SERPL-MCNC: 8.8 MG/DL (ref 8.4–10.2)
CHLORIDE SERPL-SCNC: 102 MMOL/L (ref 98–107)
CO2 SERPL-SCNC: 25 MMOL/L (ref 22–29)
CREAT SERPL-MCNC: 0.88 MG/DL (ref 0.73–1.18)
CREAT/UREA NIT SERPL: 8
EOSINOPHIL # BLD AUTO: 0.08 X10(3)/MCL (ref 0–0.9)
EOSINOPHIL NFR BLD AUTO: 0.7 %
ERYTHROCYTE [DISTWIDTH] IN BLOOD BY AUTOMATED COUNT: 14.4 % (ref 11.5–17)
GFR SERPLBLD CREATININE-BSD FMLA CKD-EPI: >60 MLS/MIN/1.73/M2
GLUCOSE SERPL-MCNC: 130 MG/DL (ref 74–100)
HCT VFR BLD AUTO: 26.3 % (ref 42–52)
HGB BLD-MCNC: 8.4 GM/DL (ref 14–18)
IMM GRANULOCYTES # BLD AUTO: 0.05 X10(3)/MCL (ref 0–0.04)
IMM GRANULOCYTES NFR BLD AUTO: 0.4 %
LYMPHOCYTES # BLD AUTO: 3.31 X10(3)/MCL (ref 0.6–4.6)
LYMPHOCYTES NFR BLD AUTO: 29.3 %
MCH RBC QN AUTO: 28.3 PG
MCHC RBC AUTO-ENTMCNC: 31.9 MG/DL (ref 33–36)
MCV RBC AUTO: 88.6 FL (ref 80–94)
MONOCYTES # BLD AUTO: 1.38 X10(3)/MCL (ref 0.1–1.3)
MONOCYTES NFR BLD AUTO: 12.2 %
NEUTROPHILS # BLD AUTO: 6.44 X10(3)/MCL (ref 2.1–9.2)
NEUTROPHILS NFR BLD AUTO: 57.1 %
NRBC BLD AUTO-RTO: 0 %
PLATELET # BLD AUTO: 241 X10(3)/MCL (ref 130–400)
PMV BLD AUTO: 8.5 FL (ref 7.4–10.4)
POTASSIUM SERPL-SCNC: 4 MMOL/L (ref 3.5–5.1)
RBC # BLD AUTO: 2.97 X10(6)/MCL (ref 4.7–6.1)
SODIUM SERPL-SCNC: 135 MMOL/L (ref 136–145)
WBC # SPEC AUTO: 11.3 X10(3)/MCL (ref 4.5–11.5)

## 2023-01-23 PROCEDURE — 25000003 PHARM REV CODE 250: Performed by: STUDENT IN AN ORGANIZED HEALTH CARE EDUCATION/TRAINING PROGRAM

## 2023-01-23 PROCEDURE — 97530 THERAPEUTIC ACTIVITIES: CPT | Mod: CQ

## 2023-01-23 PROCEDURE — 63600175 PHARM REV CODE 636 W HCPCS: Performed by: STUDENT IN AN ORGANIZED HEALTH CARE EDUCATION/TRAINING PROGRAM

## 2023-01-23 PROCEDURE — 85025 COMPLETE CBC W/AUTO DIFF WBC: CPT | Performed by: NURSE PRACTITIONER

## 2023-01-23 PROCEDURE — 25000003 PHARM REV CODE 250: Performed by: NURSE PRACTITIONER

## 2023-01-23 PROCEDURE — 97116 GAIT TRAINING THERAPY: CPT | Mod: CQ

## 2023-01-23 PROCEDURE — 80048 BASIC METABOLIC PNL TOTAL CA: CPT | Performed by: NURSE PRACTITIONER

## 2023-01-23 PROCEDURE — 36415 COLL VENOUS BLD VENIPUNCTURE: CPT | Performed by: NURSE PRACTITIONER

## 2023-01-23 PROCEDURE — 11000001 HC ACUTE MED/SURG PRIVATE ROOM

## 2023-01-23 PROCEDURE — 97535 SELF CARE MNGMENT TRAINING: CPT

## 2023-01-23 RX ORDER — OXYCODONE AND ACETAMINOPHEN 7.5; 325 MG/1; MG/1
1 TABLET ORAL EVERY 4 HOURS PRN
Qty: 42 TABLET | Refills: 0 | Status: SHIPPED | OUTPATIENT
Start: 2023-01-23 | End: 2023-01-30

## 2023-01-23 RX ORDER — KETOROLAC TROMETHAMINE 10 MG/1
10 TABLET, FILM COATED ORAL EVERY 6 HOURS PRN
Qty: 20 TABLET | Refills: 0 | Status: SHIPPED | OUTPATIENT
Start: 2023-01-23 | End: 2023-01-28

## 2023-01-23 RX ORDER — ASPIRIN 81 MG/1
81 TABLET ORAL 2 TIMES DAILY
Qty: 60 TABLET | Refills: 0 | Status: SHIPPED | OUTPATIENT
Start: 2023-01-23 | End: 2023-02-22

## 2023-01-23 RX ORDER — METHOCARBAMOL 750 MG/1
750 TABLET, FILM COATED ORAL 3 TIMES DAILY PRN
Qty: 21 TABLET | Refills: 0 | Status: SHIPPED | OUTPATIENT
Start: 2023-01-23 | End: 2023-01-30

## 2023-01-23 RX ADMIN — OXYCODONE AND ACETAMINOPHEN 1 TABLET: 10; 325 TABLET ORAL at 06:01

## 2023-01-23 RX ADMIN — OXYCODONE AND ACETAMINOPHEN 1 TABLET: 10; 325 TABLET ORAL at 02:01

## 2023-01-23 RX ADMIN — DOCUSATE SODIUM 100 MG: 100 CAPSULE, LIQUID FILLED ORAL at 09:01

## 2023-01-23 RX ADMIN — KETOROLAC TROMETHAMINE 15 MG: 30 INJECTION, SOLUTION INTRAMUSCULAR; INTRAVENOUS at 07:01

## 2023-01-23 RX ADMIN — METHOCARBAMOL 1000 MG: 100 INJECTION, SOLUTION INTRAMUSCULAR; INTRAVENOUS at 07:01

## 2023-01-23 RX ADMIN — OXYCODONE AND ACETAMINOPHEN 1 TABLET: 10; 325 TABLET ORAL at 09:01

## 2023-01-23 RX ADMIN — Medication 6 MG: at 07:01

## 2023-01-23 RX ADMIN — OXYCODONE AND ACETAMINOPHEN 1 TABLET: 10; 325 TABLET ORAL at 03:01

## 2023-01-23 RX ADMIN — MORPHINE SULFATE 2 MG: 4 INJECTION INTRAVENOUS at 01:01

## 2023-01-23 RX ADMIN — METHOCARBAMOL 1000 MG: 100 INJECTION, SOLUTION INTRAMUSCULAR; INTRAVENOUS at 06:01

## 2023-01-23 RX ADMIN — METHOCARBAMOL 1000 MG: 100 INJECTION, SOLUTION INTRAMUSCULAR; INTRAVENOUS at 03:01

## 2023-01-23 RX ADMIN — DOCUSATE SODIUM 100 MG: 100 CAPSULE, LIQUID FILLED ORAL at 07:01

## 2023-01-23 RX ADMIN — OXYCODONE AND ACETAMINOPHEN 1 TABLET: 10; 325 TABLET ORAL at 07:01

## 2023-01-23 NOTE — PLAN OF CARE
01/23/23 0908   Discharge Assessment   Assessment Type Discharge Planning Assessment   Confirmed/corrected address, phone number and insurance Yes   Reason   (Pt lives at [address] [place] [address], [place], [place])   Confirmed Demographics   (Not correct)   Source of Information patient   Reason For Admission femur fx, sp larry placed   People in Home grandparent(s)  (Pt reports he lives with his grandmother in a single story home with 4 steps to enter and no rails)   Do you expect to return to your current living situation? Yes   Do you have help at home or someone to help you manage your care at home? No   Prior to hospitilization cognitive status: Alert/Oriented   Current cognitive status: Alert/Oriented   Walking or Climbing Stairs   (Independent prior to hospital stay)   Home Layout Able to live on 1st floor   Equipment Currently Used at Home none   Patient currently being followed by outpatient case management? No   Do you currently have service(s) that help you manage your care at home? No   Who is going to help you get home at discharge? Family   How do you get to doctors appointments? car, drives self   Are you on dialysis? No   Discharge Plan A   (Home)   Discharge Plan B   (Home)   DME Needed Upon Discharge  walker, rolling   Discharge Plan discussed with: Patient   Discharge Barriers Identified None   Housing Stability   In the last 12 months, was there a time when you were not able to pay the mortgage or rent on time? N   Transportation Needs   In the past 12 months, has lack of transportation kept you from medical appointments or from getting medications? no   Food Insecurity   Within the past 12 months, you worried that your food would run out before you got the money to buy more. Never true   OTHER   Name(s) of People in Home grand mother, Domitila Camejo     Pt does not have a PCP. His  is his mother, Jessica ([phone]). He has never had HH services. He uses [facility] pharmacy in   [place]. He was driving and working at [employer] which requires no heavy lifting.   Will obtain a RW for home use. No other dc needs at this time.

## 2023-01-23 NOTE — PT/OT/SLP PROGRESS
Occupational Therapy   Treatment    Name: William Yang  MRN: [id number]  Admitting Diagnosis:  Closed displaced comminuted fracture of shaft of left femur  2 Days Post-Op    Recommendations:     Discharge Recommendations: home  Discharge Equipment Recommendations:  walker, rolling  Barriers to discharge:  None    Assessment:     William Yang is a 27 y.o. male with a medical diagnosis of Closed displaced comminuted fracture of shaft of left femur s/p IMN, L fibula fracture, L patella fracture s/p ORIF.  He presents with impaired LE dressing d/t pain, however he is able to perform it with min A after training on lower body AE. Performance deficits affecting function are pain, impaired functional mobility, impaired self care skills. Assistance from family will be provided as needed upon d/c home.    Rehab Prognosis:  Good; patient would benefit from acute skilled OT services to address these deficits and reach maximum level of function.       Plan:     Patient to be seen 4 x/week, 5 x/week to address the above listed problems via self-care/home management, therapeutic activities, therapeutic exercises  Plan of Care Expires:    Plan of Care Reviewed with: patient    Subjective     Pain/Comfort:  Pain Rating 1: 10/10  Location - Side 1: Left  Location 1: leg  Pain Addressed 1: Distraction, Reposition    Objective:     Patient found up in chair   upon OT entry to room.    General Precautions: Standard, fall    Orthopedic Precautions:LLE weight bearing as tolerated (full ROM)  Braces:  (Camboot, ACE bandage on LLE)  Respiratory Status: Room air     Occupational Performance:     Functional Mobility/Transfers:  Patient completed Sit <> Stand Transfer with contact guard assistance  with  rolling walker     Activities of Daily Living:  Upper Body Dressing: SBA for donning robe while standing  Lower Body Dressing: stand by assistance for donning/doffing socks and underwear using hip kit seated in chair. Min A to don/doff  Camboot while seated in chair.      [facility] 6 Click ADL: 19    Treatment & Education:  ADL and AE training provided.    Patient left up in chair with call button in reach    GOALS:   Multidisciplinary Problems       Occupational Therapy Goals          Problem: Occupational Therapy    Goal Priority Disciplines Outcome Interventions   Occupational Therapy Goal     OT, PT/OT Ongoing, Progressing    Description: 1. Pt will ambulate to bathroom with RW mod I  2. Pt will complete LE dressing with AE as needed.  3. Pt will complete toilet t/f mod I.  4. Pt will complete toileting tasks mod I  5. Pt will complete grooming in standing at sink mod I                       Time Tracking:     OT Date of Treatment: 01/23/23  OT Start Time: 1038  OT Stop Time: 1101  OT Total Time (min): 23 min    Billable Minutes:Self Care/Home Management 2    OT/SHAQUILLE: OT     SHAQUILLE Visit Number: 1 1/23/2023

## 2023-01-23 NOTE — PROGRESS NOTES
"Patient Name: William Yang  MRN: [id number]  Admission Date: 1/21/2023  Hospital Length of Stay: 2 days  Attending Provider: Delgado Rasmussen MD  Primary Care Provider: Primary Doctor No  Follow-up For: Procedure(s) (LRB):  INSERTION, ANTEGRADE INTRAMEDULLARY VANESSA, LEFT FEMUR (Left)    Post-Operative Day: 2 Day Post-Op  Subjective:       Principal Orthopedic Problem: 2 Days Post-Op   IMN L femur; ORIF L patella    Interval History:  Pt states he is doing well today. His pain is controlled with medications. Tolerating diet. Voiding. States he worked with PT yesterday on mobilizing, still requiring some assistance with mobilizing. No new complaints.     Review of patient's allergies indicates:  Not on File    Current Facility-Administered Medications   Medication    0.9%  NaCl infusion    acetaminophen tablet 650 mg    aluminum-magnesium hydroxide-simethicone 200-200-20 mg/5 mL suspension 30 mL    bisacodyL suppository 10 mg    diphenhydrAMINE capsule 25 mg    docusate sodium capsule 100 mg    ketorolac injection 15 mg    lactated ringers infusion    melatonin tablet 6 mg    methocarbamoL injection 1,000 mg    morphine injection 2 mg    morphine injection 4 mg    ondansetron 4 mg/5 mL solution 4 mg    ondansetron injection 4 mg    oxyCODONE-acetaminophen  mg per tablet 1 tablet    oxyCODONE-acetaminophen 5-325 mg per tablet 1 tablet    polyethylene glycol packet 17 g    propofol (DIPRIVAN) 10 mg/mL IVP    sodium chloride 0.9% flush 10 mL     Objective:     Vital Signs (Most Recent):  Temp: 98.7 °F (37.1 °C) (01/23/23 0717)  Pulse: 76 (01/23/23 0717)  Resp: 19 (01/23/23 0952)  BP: 127/73 (01/23/23 0717)  SpO2: 99 % (01/23/23 0717) Vital Signs (24h Range):  Temp:  [97.9 °F (36.6 °C)-99.4 °F (37.4 °C)] 98.7 °F (37.1 °C)  Pulse:  [75-87] 76  Resp:  [15-19] 19  SpO2:  [99 %-100 %] 99 %  BP: (110-132)/(72-82) 127/73     Weight: 58.1 kg (128 lb)  Height: 5' 5" (165.1 cm)  Body mass index is 21.3 kg/m².      Intake/Output " Summary (Last 24 hours) at 1/23/2023 1035  Last data filed at 1/23/2023 0429  Gross per 24 hour   Intake 1440 ml   Output 2040 ml   Net -600 ml         Physical Exam:   Ortho/SPM Exam    General the patient is alert and oriented x3 no acute distress nontoxic-appearing appropriate affect.    Constitutional: Vital signs are examined and stable.  Resp: No signs of labored breathing    Musculoskeletal Exam:  Left upper extremity:  Full active range of motion of the shoulder.  Pain at the extremes of forward elevation over the anterior superior aspect of the shoulder.  No pain with internal external rotation of the shoulder or with gentle passive circumduction.  Full range of motion of the elbow wrist and digits.  Neurovascularly intact distally.      Left lower extremity:  Cam boot in place.  Brisk capillary refill to all digits.  Lower limb compartments soft and compressible.  Thigh swollen but compressible.  Dressings clean dry and intact.  Good range motion of the ankle and digits.    Diagnostic Findings:   Significant Labs: BMP:   Recent Labs   Lab 01/23/23 0447   *   K 4.0   CO2 25   BUN 6.9*   CREATININE 0.88   CALCIUM 8.8       CBC:   Recent Labs   Lab 01/22/23 0428 01/23/23 0447   WBC 9.9 11.3   HGB 8.1* 8.4*   HCT 24.4* 26.3*    241       CMP:   Recent Labs   Lab 01/22/23 0428 01/23/23 0447   * 135*   K 4.1 4.0   CO2 24 25   BUN 12.3 6.9*   CREATININE 0.96 0.88   CALCIUM 7.9* 8.8       Lactic Acid: No results for input(s): LACTATE in the last 48 hours.  All pertinent labs within the past 24 hours have been reviewed.        Significant Imaging: I have reviewed and interpreted all pertinent imaging results/findings.     Assessment/Plan:     Active Diagnoses:    Diagnosis Date Noted POA    PRINCIPAL PROBLEM:  Closed displaced comminuted fracture of shaft of left femur [S72.352A] 01/21/2023 Yes    Closed nondisplaced transverse fracture of shaft of left fibula [S82.425A] 01/21/2023 Yes     Displaced longitudinal fracture of left patella, initial encounter for open fracture type I or II [S82.022B] 01/21/2023 Yes      Problems Resolved During this Admission:     Pt is doing well today. H&H is down as expected post op but stable compared to yesterday. Pt asymptomatic with stable VS. Continue PT today. He can weight bear as tolerated in the Cam boot and use crutches or a walker.  Full range of motion of the limb, he can remove the boot for range of motion of the ankle. If he is able to mobilize well today, plan for dc home this afternoon. CM for DME. Will send with percocet, robaxin, and toradol for pain control. Lovenox while  in house. Aspirin 81 mg PO BID on d/c. Begin daily dry dressing changes today. Continue supportive treatment for L shoulder. Continue full activities without restrictions to the shoulder.  We will continue to monitor his symptoms over time and consider advanced imaging if he does not improve with rest ice and physical therapy.  Follow up with Dr. Rasmussen in 2 weeks for wound check and staple removal.     The above findings, diagnosis, and treatment plan were discussed with Dr. Delgado Rasmussen who is in agreement.

## 2023-01-23 NOTE — PT/OT/SLP PROGRESS
Physical Therapy  Treatment    William Yang   MRN: [id number]   Admitting Diagnosis: Closed displaced comminuted fracture of shaft of left femur       PT Start Time: 1011     PT Stop Time: 1037    PT Total Time (min): 26 min       Billable Minutes:  Gait Training 13 and Therapeutic Activity 13    Treatment Type: Treatment  PT/PTA: PTA     PTA Visit Number: 1       General Precautions: Standard,    Orthopedic Precautions: LLE weight bearing as tolerated  Braces:  (CAM boot LLE)  Respiratory Status: Room air    Spiritual, Cultural Beliefs, Nondenominational Practices, Values that Affect Care: no    Subjective:  Communicated with NSG prior to session. Pt awake and alert in semi supine upon PT arrival.          Objective:   Patient found with:  (CAM boot donned on LLE)    Functional Mobility:  Bed Mobility:    Supine to sit: Min A; can initiate moving BLE OOB    Transfers:   Sit to stand: Min A using RW    Gait:    Pt amb 10ft to bathroom commode, 55ft x 2 trials with a standing rest break using RW at [facility]. Pt c/o being fatigued during and preceded to lean on RW however vc given for safety. Asked pt if he wanted to take seated rest break however he insisted on amb back to his room. Vc for proper technique with RW and to increase step length. Slight flexed posture noted.     Balance:   Static Stand: GOOD: Takes MODERATE challenges from all directions  Dynamic stand: FAIR: Needs CONTACT GUARD during gait       Treatment and Education:  Pt amb to bathroom commode. Assistance with hygiene products. Pt left UIC after gait. OT present at the end of session. Pt c/o pain in LLE when the extremity was slightly bent during t/f and bed mobility.     AM-PAC 6 CLICK MOBILITY  How much help from another person does this patient currently need?   1 = Unable, Total/Dependent Assistance  2 = A lot, Maximum/Moderate Assistance  3 = A little, Minimum/Contact Guard/Supervision  4 = None, Modified [place]/Independent         AM-PAC Raw Score CMS  G-Code Modifier Level of Impairment Assistance   6 % Total / Unable   7 - 9 CM 80 - 100% Maximal Assist   10 - 14 CL 60 - 80% Moderate Assist   15 - 19 CK 40 - 60% Moderate Assist   20 - 22 CJ 20 - 40% Minimal Assist   23 CI 1-20% SBA / CGA   24 CH 0% Independent/ Mod I     Patient left up in chair with all lines intact, call button in reach, and OT present.    Assessment:  William Yang is a 27 y.o. male with a medical diagnosis of Closed displaced comminuted fracture of shaft of left femur and presents with fatigue, weakness, and pain with mobility in LLE.    Rehab identified problem list/impairments: pain, impaired functional mobility    Rehab potential is excellent.    Activity tolerance: Excellent    Discharge recommendations: home      Barriers to discharge:      Equipment recommendations: walker, rolling     GOALS:   Multidisciplinary Problems       Physical Therapy Goals          Problem: Physical Therapy    Goal Priority Disciplines Outcome Goal Variances Interventions   Physical Therapy Goal     PT, PT/OT Ongoing, Progressing     Description: Pt will be seen 5x/week for 1 week to work on the following goals 1. Sit to stand with rw with sba 2 amb sba 150ft with rw WBAT LLE                         PLAN:    Patient to be seen 5 x/week to address the above listed problems via gait training, therapeutic activities  Plan of Care expires: 01/29/23  Plan of Care reviewed with: patient, other (see comments) (brother in law)         01/23/2023

## 2023-01-23 NOTE — CARE UPDATE
[id number] Spoke with Chuyita at [facility] who reported they have one RW left. Faxed pt's referral to [facility] at [phone] for RW for home use

## 2023-01-24 VITALS
TEMPERATURE: 99 F | OXYGEN SATURATION: 100 % | DIASTOLIC BLOOD PRESSURE: 78 MMHG | HEART RATE: 80 BPM | RESPIRATION RATE: 19 BRPM | WEIGHT: 128 LBS | BODY MASS INDEX: 21.33 KG/M2 | HEIGHT: 65 IN | SYSTOLIC BLOOD PRESSURE: 127 MMHG

## 2023-01-24 LAB
ANION GAP SERPL CALC-SCNC: 8 MEQ/L
BASOPHILS # BLD AUTO: 0.02 X10(3)/MCL (ref 0–0.2)
BASOPHILS NFR BLD AUTO: 0.2 %
BUN SERPL-MCNC: 10.1 MG/DL (ref 8.9–20.6)
CALCIUM SERPL-MCNC: 8.9 MG/DL (ref 8.4–10.2)
CHLORIDE SERPL-SCNC: 100 MMOL/L (ref 98–107)
CO2 SERPL-SCNC: 26 MMOL/L (ref 22–29)
CREAT SERPL-MCNC: 0.79 MG/DL (ref 0.73–1.18)
CREAT/UREA NIT SERPL: 13
EOSINOPHIL # BLD AUTO: 0.13 X10(3)/MCL (ref 0–0.9)
EOSINOPHIL NFR BLD AUTO: 1.2 %
ERYTHROCYTE [DISTWIDTH] IN BLOOD BY AUTOMATED COUNT: 14.1 % (ref 11.5–17)
GFR SERPLBLD CREATININE-BSD FMLA CKD-EPI: >60 MLS/MIN/1.73/M2
GLUCOSE SERPL-MCNC: 94 MG/DL (ref 74–100)
HCT VFR BLD AUTO: 22.5 % (ref 42–52)
HGB BLD-MCNC: 7.5 GM/DL (ref 14–18)
IMM GRANULOCYTES # BLD AUTO: 0.08 X10(3)/MCL (ref 0–0.04)
IMM GRANULOCYTES NFR BLD AUTO: 0.7 %
LYMPHOCYTES # BLD AUTO: 2.48 X10(3)/MCL (ref 0.6–4.6)
LYMPHOCYTES NFR BLD AUTO: 22.3 %
MCH RBC QN AUTO: 28.6 PG
MCHC RBC AUTO-ENTMCNC: 33.3 MG/DL (ref 33–36)
MCV RBC AUTO: 85.9 FL (ref 80–94)
MONOCYTES # BLD AUTO: 1.21 X10(3)/MCL (ref 0.1–1.3)
MONOCYTES NFR BLD AUTO: 10.9 %
NEUTROPHILS # BLD AUTO: 7.22 X10(3)/MCL (ref 2.1–9.2)
NEUTROPHILS NFR BLD AUTO: 64.7 %
NRBC BLD AUTO-RTO: 0 %
PLATELET # BLD AUTO: 272 X10(3)/MCL (ref 130–400)
PMV BLD AUTO: 8.9 FL (ref 7.4–10.4)
POTASSIUM SERPL-SCNC: 4.5 MMOL/L (ref 3.5–5.1)
RBC # BLD AUTO: 2.62 X10(6)/MCL (ref 4.7–6.1)
SODIUM SERPL-SCNC: 134 MMOL/L (ref 136–145)
WBC # SPEC AUTO: 11.1 X10(3)/MCL (ref 4.5–11.5)

## 2023-01-24 PROCEDURE — 80048 BASIC METABOLIC PNL TOTAL CA: CPT | Performed by: NURSE PRACTITIONER

## 2023-01-24 PROCEDURE — 25000003 PHARM REV CODE 250: Performed by: NURSE PRACTITIONER

## 2023-01-24 PROCEDURE — 63600175 PHARM REV CODE 636 W HCPCS: Performed by: STUDENT IN AN ORGANIZED HEALTH CARE EDUCATION/TRAINING PROGRAM

## 2023-01-24 PROCEDURE — 85025 COMPLETE CBC W/AUTO DIFF WBC: CPT | Performed by: NURSE PRACTITIONER

## 2023-01-24 PROCEDURE — 36415 COLL VENOUS BLD VENIPUNCTURE: CPT | Performed by: NURSE PRACTITIONER

## 2023-01-24 PROCEDURE — 97535 SELF CARE MNGMENT TRAINING: CPT

## 2023-01-24 PROCEDURE — 94761 N-INVAS EAR/PLS OXIMETRY MLT: CPT

## 2023-01-24 RX ADMIN — DOCUSATE SODIUM 100 MG: 100 CAPSULE, LIQUID FILLED ORAL at 08:01

## 2023-01-24 RX ADMIN — METHOCARBAMOL 1000 MG: 100 INJECTION, SOLUTION INTRAMUSCULAR; INTRAVENOUS at 05:01

## 2023-01-24 RX ADMIN — OXYCODONE AND ACETAMINOPHEN 1 TABLET: 10; 325 TABLET ORAL at 03:01

## 2023-01-24 RX ADMIN — OXYCODONE AND ACETAMINOPHEN 1 TABLET: 10; 325 TABLET ORAL at 08:01

## 2023-01-24 NOTE — NURSING
Patient understands and discharge teaching. Wound care was taught and patient understands the importance of keeping them dry and clean. Follow up appointment was made and patient understands the importance of attending and when to call the MD with any questions or concerns. Scripts were given to patient to fill. Iv as removed without complications. No further questions were asked. Patient stable upon discharge.

## 2023-01-24 NOTE — PROGRESS NOTES
Patient Name: William Yang  MRN: [id number]  Admission Date: 1/21/2023  Hospital Length of Stay: 3 days  Attending Provider: No att. providers found  Primary Care Provider: Primary Doctor No  Follow-up For: Procedure(s) (LRB):  INSERTION, ANTEGRADE INTRAMEDULLARY VANESSA, LEFT FEMUR (Left)    Post-Operative Day: 3 Day Post-Op  Subjective:       Principal Orthopedic Problem: 3 Days Post-Op   IMN L femur; ORIF L patella    Interval History:  Pt states he is doing well today. His pain is controlled with medications. Tolerating diet. Voiding. States he worked with PT yesterday on mobilizing. No new complaints.  Ready for discharge home today.    Review of patient's allergies indicates:  Not on File    Current Facility-Administered Medications   Medication    0.9%  NaCl infusion    acetaminophen tablet 650 mg    aluminum-magnesium hydroxide-simethicone 200-200-20 mg/5 mL suspension 30 mL    bisacodyL suppository 10 mg    diphenhydrAMINE capsule 25 mg    docusate sodium capsule 100 mg    lactated ringers infusion    melatonin tablet 6 mg    methocarbamoL injection 1,000 mg    morphine injection 2 mg    morphine injection 4 mg    ondansetron 4 mg/5 mL solution 4 mg    ondansetron injection 4 mg    oxyCODONE-acetaminophen  mg per tablet 1 tablet    oxyCODONE-acetaminophen 5-325 mg per tablet 1 tablet    polyethylene glycol packet 17 g    propofol (DIPRIVAN) 10 mg/mL IVP    sodium chloride 0.9% flush 10 mL     Current Outpatient Medications   Medication Sig    aspirin (ECOTRIN) 81 MG EC tablet Take 1 tablet (81 mg total) by mouth 2 (two) times a day.    ketorolac (TORADOL) 10 mg tablet Take 1 tablet (10 mg total) by mouth every 6 (six) hours as needed for Pain.    methocarbamoL (ROBAXIN) 750 MG Tab Take 1 tablet (750 mg total) by mouth 3 (three) times daily as needed (spasm).    oxyCODONE-acetaminophen (PERCOCET) 7.5-325 mg per tablet Take 1 tablet by mouth every 4 (four) hours as needed for Pain.     Objective:     Vital  "Signs (Most Recent):  Temp: 98.7 °F (37.1 °C) (01/24/23 0750)  Pulse: 80 (01/24/23 0750)  Resp: 19 (01/24/23 0828)  BP: 127/78 (01/24/23 0750)  SpO2: 100 % (01/24/23 0750) Vital Signs (24h Range):  Temp:  [98.2 °F (36.8 °C)-98.7 °F (37.1 °C)] 98.7 °F (37.1 °C)  Pulse:  [] 80  Resp:  [15-19] 19  SpO2:  [97 %-100 %] 100 %  BP: (107-133)/(62-78) 127/78     Weight: 58.1 kg (128 lb)  Height: 5' 5" (165.1 cm)  Body mass index is 21.3 kg/m².      Intake/Output Summary (Last 24 hours) at 1/24/2023 1308  Last data filed at 1/24/2023 0310  Gross per 24 hour   Intake --   Output 600 ml   Net -600 ml         Physical Exam:   Ortho/SPM Exam    General the patient is alert and oriented x3 no acute distress nontoxic-appearing appropriate affect.    Constitutional: Vital signs are examined and stable.  Resp: No signs of labored breathing    Musculoskeletal Exam:  Left upper extremity:  Full active range of motion of the shoulder.  Pain at the extremes of forward elevation over the anterior superior aspect of the shoulder.  No pain with internal external rotation of the shoulder or with gentle passive circumduction.  Full range of motion of the elbow wrist and digits.  Neurovascularly intact distally.      Left lower extremity:  Cam boot in place.  Brisk capillary refill to all digits.  Lower limb compartments soft and compressible.  Thigh swollen but compressible.  Dressings clean dry and intact.  Good range motion of the ankle and digits.    Diagnostic Findings:   Significant Labs: BMP:   Recent Labs   Lab 01/24/23 0416   *   K 4.5   CO2 26   BUN 10.1   CREATININE 0.79   CALCIUM 8.9       CBC:   Recent Labs   Lab 01/23/23 0447 01/24/23 0416   WBC 11.3 11.1   HGB 8.4* 7.5*   HCT 26.3* 22.5*    272       CMP:   Recent Labs   Lab 01/23/23 0447 01/24/23  0416   * 134*   K 4.0 4.5   CO2 25 26   BUN 6.9* 10.1   CREATININE 0.88 0.79   CALCIUM 8.8 8.9       Lactic Acid: No results for input(s): LACTATE in the " last 48 hours.  All pertinent labs within the past 24 hours have been reviewed.        Significant Imaging: I have reviewed and interpreted all pertinent imaging results/findings.     Assessment/Plan:     Active Diagnoses:    Diagnosis Date Noted POA    PRINCIPAL PROBLEM:  Closed displaced comminuted fracture of shaft of left femur [S72.352A] 01/21/2023 Yes    Closed nondisplaced transverse fracture of shaft of left fibula [S82.425A] 01/21/2023 Yes    Displaced longitudinal fracture of left patella, initial encounter for open fracture type I or II [S82.022B] 01/21/2023 Yes      Problems Resolved During this Admission:     Pt is doing well today. H&H is down as expected post op. Pt asymptomatic with stable VS. Continue PT today. He can weight bear as tolerated in the Cam boot and use crutches or a walker.  Full range of motion of the limb, he can remove the boot for range of motion of the ankle. CM for DME. Will send with percocet, robaxin, and toradol for pain control. Lovenox while  in house. Aspirin 81 mg PO BID on d/c. Begin daily dry dressing changes today. Continue supportive treatment for L shoulder. Continue full activities without restrictions to the shoulder.  We will continue to monitor his symptoms over time and consider advanced imaging if he does not improve with rest ice and physical therapy.  Follow up with us in 2 weeks for wound check and staple removal. Read for d/c home today.      Delgado Rasmussen MD  Orthopedic Trauma  [facility]

## 2023-01-24 NOTE — ANESTHESIA POSTPROCEDURE EVALUATION
Anesthesia Post Evaluation    Patient: William Yang    Procedure(s) Performed: Procedure(s) (LRB):  INSERTION, ANTEGRADE INTRAMEDULLARY VANESSA, LEFT FEMUR (Left)    Final Anesthesia Type: general      Patient location during evaluation: PACU  Patient participation: Yes- Able to Participate  Level of consciousness: awake and alert  Post-procedure vital signs: reviewed and stable  Pain management: adequate  Airway patency: patent      Anesthetic complications: no      Cardiovascular status: hemodynamically stable  Respiratory status: unassisted  Hydration status: euvolemic  Follow-up not needed.          Vitals Value Taken Time   /78 01/24/23 0750   Temp 37.1 °C (98.7 °F) 01/24/23 0750   Pulse 80 01/24/23 0750   Resp 19 01/24/23 0828   SpO2 100 % 01/24/23 0750         Event Time   Out of Recovery 16:25:00         Pain/Nico Score: Pain Rating Prior to Med Admin: 7 (1/24/2023  8:28 AM)  Pain Rating Post Med Admin: 0 (1/23/2023  8:23 PM)  Nico Score: 10 (1/23/2023  8:00 AM)

## 2023-01-24 NOTE — PT/OT/SLP PROGRESS
Occupational Therapy   Treatment    Name: William Yang  MRN: [id number]  Admitting Diagnosis:  Closed displaced comminuted fracture of shaft of left femur  3 Days Post-Op    Recommendations:     Discharge Recommendations: home  Discharge Equipment Recommendations:  walker, rolling  Barriers to discharge:  None    Assessment:     William Yang is a 27 y.o. male with a medical diagnosis of Closed displaced comminuted fracture of shaft of left femur s/p IMN, L fibula fracture, L patella fracture s/p ORIF. He presents with decreased pain as compared to last session and good understanding of tub t/f. Performance deficits affecting function are pain, impaired balance, impaired self care skills, impaired functional mobility, orthopedic precautions, decreased lower extremity function. 5/5 STG met. Pt d/c home with assistance of family as needed.     Plan:     Discontinue OT services d/t all goals met and anticipated d/c home today    Subjective     Pain/Comfort:  Pain Rating 1: 0/10    Objective:     Patient found HOB elevated with  (camboot on LLE) upon OT entry to room.    General Precautions: Standard, fall    Orthopedic Precautions:LLE weight bearing as tolerated  Braces:  (Camboot on LLE)  Respiratory Status: Room air     Occupational Performance:     Bed Mobility:    Patient completed Supine to Sit with stand by assistance     Functional Mobility/Transfers:  Patient completed Sit <> Stand Transfer with modified independence  with  rolling walker   Patient completed Toilet Transfer Step Transfer technique with modified independence with  rolling walker  Patient completed Tub Transfer Step Transfer technique with stand by assistance with rolling walker  Functional Mobility: pt walked around bathroom with mod I using a RW, ~7ft    Activities of Daily Living:  Grooming: modified independence for washing hands standing at sink using RW      [facility] 6 Click ADL: 20    Treatment & Education:  ADLs and Tub t/f training  performed    Patient left up in chair with call button in reach    GOALS: 5/5 met  Multidisciplinary Problems       Occupational Therapy Goals          Problem: Occupational Therapy    Goal Priority Disciplines Outcome Interventions   Occupational Therapy Goal     OT, PT/OT Ongoing, Progressing    Description: 1. Pt will ambulate to bathroom with RW mod I  2. Pt will complete LE dressing with AE as needed.  3. Pt will complete toilet t/f mod I.  4. Pt will complete toileting tasks mod I  5. Pt will complete grooming in standing at sink mod I                       Time Tracking:     OT Date of Treatment: 01/24/23  OT Start Time: 0945  OT Stop Time: 1008  OT Total Time (min): 23 min    Billable Minutes:Self Care/Home Management 2    OT/SHAQUILLE: OT     SHAQUILLE Visit Number: 2    1/24/2023

## 2023-01-25 ENCOUNTER — PATIENT OUTREACH (OUTPATIENT)
Dept: ADMINISTRATIVE | Facility: CLINIC | Age: 28
End: 2023-01-25
Payer: COMMERCIAL
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
## 2023-01-25 NOTE — PROGRESS NOTES
C3 nurse attempted to contact William Yang  for a TCC post hospital discharge follow up call. No answer. Left voicemail with callback information. The patient has a scheduled HOSFU appointment with Dr. Rasmussen on 02/09/2023 @ 10 am.

## 2023-01-26 NOTE — PROGRESS NOTES
C3 nurse spoke with William Yang  for a TCC post hospital discharge follow up call. The patient has a scheduled HOSFU appointment with Dr. Rasmussen on 02/09/2023 @ 10 am.

## 2023-01-27 NOTE — PHYSICIAN QUERY
PT Name: William Yang  MR #: [id number]     DOCUMENTATION CLARIFICATION      CDS/: DYLON Cates, RN               Contact information:[email or web address]    This form is a permanent document in the medical record.     Query Date: January 27, 2023    Dear Provider,  By submitting this query, we are merely seeking further clarification of documentation.  Please utilize your independent clinical judgment when addressing the question(s) below.     The Medical Record contains the following:    Supporting Clinical Findings Location in Medical Record   POSTOPERATIVE DIAGNOSES:    1. Left comminuted femur shaft fracture.  2. Left open longitudinal patella fracture    PROCEDURES:    1. Intramedullary nailing of the left femur, antegrade.  2. Irrigation and debridement of open fracture including skin, subcutaneous   tissue, muscle, and bone.  3. Open treatment of the left patella fracture.    ESTIMATED BLOOD LOSS:  Total 100 cc.   Orthopedic Surgery Op Note File Time: 01/21/2023 6:13 PM   His H and H is down as expected postop due to acute blood loss following surgery, we will continue to monitor.  If it is stable tomorrow his pain is controlled he can be discharged home.    Lab 01/21/23  0422 01/22/23 0428   WBC 14.9 9.9   HGB 13.7 8.1   HCT 42.5 24.4     BP: ()/(58-94)    Pulse:  []   Orthopedic Surgery Progress Notes File Time: 01/22/2023 9:15 AM                     H&H is down as expected post op but stable compared to yesterday. Pt asymptomatic with stable VS.    Lab 01/22/23  0428 01/23/23 0447   HGB 8.1 8.4   HCT 24.4 26.3    Orthopedic Surgery Progress Notes File Time: 01/23/2023 10:39 AM               H&H is down as expected post op. Pt asymptomatic with stable VS.     Lab 01/23/23 0447 01/24/23  0416   WBC 11.3 11.1   HGB 8.4 7.5   HCT 26.3 22.5     Ready for d/c home today.    Orthopedic Surgery Progress Notes File Time: 01/24/2023 1:10 PM                   Please clarify if the Acute Blood  Loss Anemia diagnosis has been:    [  ] Ruled In   [ X ] Ruled In, Now Resolved   [  ] Ruled Out   [  ] Other/Clarification of findings (please specify): _______________           Please document in your progress notes daily for the duration of treatment, until resolved, and include in your discharge summary.    Form No. [id number]

## 2023-02-09 ENCOUNTER — OFFICE VISIT (OUTPATIENT)
Dept: ORTHOPEDICS | Facility: CLINIC | Age: 28
End: 2023-02-09
Payer: MEDICAID

## 2023-02-09 VITALS
HEART RATE: 93 BPM | BODY MASS INDEX: 21.33 KG/M2 | HEIGHT: 65 IN | WEIGHT: 128 LBS | TEMPERATURE: 98 F | SYSTOLIC BLOOD PRESSURE: 114 MMHG | DIASTOLIC BLOOD PRESSURE: 81 MMHG

## 2023-02-09 DIAGNOSIS — S82.022B: ICD-10-CM

## 2023-02-09 DIAGNOSIS — S82.425A CLOSED NONDISPLACED TRANSVERSE FRACTURE OF SHAFT OF LEFT FIBULA, INITIAL ENCOUNTER: ICD-10-CM

## 2023-02-09 DIAGNOSIS — S72.352A CLOSED DISPLACED COMMINUTED FRACTURE OF SHAFT OF LEFT FEMUR, INITIAL ENCOUNTER: Primary | ICD-10-CM

## 2023-02-09 PROCEDURE — 3074F PR MOST RECENT SYSTOLIC BLOOD PRESSURE < 130 MM HG: ICD-10-PCS | Mod: CPTII,,, | Performed by: NURSE PRACTITIONER

## 2023-02-09 PROCEDURE — 3079F PR MOST RECENT DIASTOLIC BLOOD PRESSURE 80-89 MM HG: ICD-10-PCS | Mod: CPTII,,, | Performed by: NURSE PRACTITIONER

## 2023-02-09 PROCEDURE — 3074F SYST BP LT 130 MM HG: CPT | Mod: CPTII,,, | Performed by: NURSE PRACTITIONER

## 2023-02-09 PROCEDURE — 3008F PR BODY MASS INDEX (BMI) DOCUMENTED: ICD-10-PCS | Mod: CPTII,,, | Performed by: NURSE PRACTITIONER

## 2023-02-09 PROCEDURE — 1160F RVW MEDS BY RX/DR IN RCRD: CPT | Mod: CPTII,,, | Performed by: NURSE PRACTITIONER

## 2023-02-09 PROCEDURE — 99024 POSTOP FOLLOW-UP VISIT: CPT | Mod: ,,, | Performed by: NURSE PRACTITIONER

## 2023-02-09 PROCEDURE — 99024 PR POST-OP FOLLOW-UP VISIT: ICD-10-PCS | Mod: ,,, | Performed by: NURSE PRACTITIONER

## 2023-02-09 PROCEDURE — 3079F DIAST BP 80-89 MM HG: CPT | Mod: CPTII,,, | Performed by: NURSE PRACTITIONER

## 2023-02-09 PROCEDURE — 1159F PR MEDICATION LIST DOCUMENTED IN MEDICAL RECORD: ICD-10-PCS | Mod: CPTII,,, | Performed by: NURSE PRACTITIONER

## 2023-02-09 PROCEDURE — 1160F PR REVIEW ALL MEDS BY PRESCRIBER/CLIN PHARMACIST DOCUMENTED: ICD-10-PCS | Mod: CPTII,,, | Performed by: NURSE PRACTITIONER

## 2023-02-09 PROCEDURE — 3008F BODY MASS INDEX DOCD: CPT | Mod: CPTII,,, | Performed by: NURSE PRACTITIONER

## 2023-02-09 PROCEDURE — 1159F MED LIST DOCD IN RCRD: CPT | Mod: CPTII,,, | Performed by: NURSE PRACTITIONER

## 2023-02-09 RX ORDER — OXYCODONE AND ACETAMINOPHEN 7.5; 325 MG/1; MG/1
1 TABLET ORAL EVERY 6 HOURS PRN
Qty: 28 TABLET | Refills: 0 | Status: SHIPPED | OUTPATIENT
Start: 2023-02-09 | End: 2023-02-22 | Stop reason: SDUPTHER

## 2023-02-09 RX ORDER — OXYCODONE AND ACETAMINOPHEN 7.5; 325 MG/1; MG/1
1 TABLET ORAL EVERY 4 HOURS PRN
COMMUNITY
End: 2023-02-09

## 2023-02-09 RX ORDER — METHOCARBAMOL 500 MG/1
500 TABLET, FILM COATED ORAL 3 TIMES DAILY PRN
Qty: 21 TABLET | Refills: 0 | Status: SHIPPED | OUTPATIENT
Start: 2023-02-09 | End: 2023-03-02 | Stop reason: SDUPTHER

## 2023-02-09 RX ORDER — METHOCARBAMOL 750 MG/1
500 TABLET, FILM COATED ORAL 4 TIMES DAILY
COMMUNITY
End: 2023-02-09 | Stop reason: SDUPTHER

## 2023-02-09 NOTE — PROGRESS NOTES
Subjective:       Patient ID: William Yang is a 27 y.o. male.    Chief Complaint   Patient presents with    Left Femur - Follow-up     2.5 month f/u form imn left femur fx. In wheelchair. Complains of pain in left knee and ankle.         Patient is here today for follow up evaluation 2.5 weeks out from intramedullary nailing of left femur fracture. He also underwent irrigation and debridement of an open patella fracture and has a left fibula fracture being treated non operatively. He states he is doing well today. Pain has been controlled with medication. He is requesting a refill of pain medicine today. He states his incisions have been clean and dry with no erythema or drainage. He has been partially weight bearing in his cam boot with crutches. He states he has been performing gentle stretching exercises. No other issues or complaints were reported today.       Review of Systems   Constitutional: Negative for chills and fever.   HENT:  Negative for congestion and hearing loss.    Eyes:  Negative for visual disturbance.   Cardiovascular:  Negative for chest pain and syncope.   Respiratory:  Negative for cough and shortness of breath.    Hematologic/Lymphatic: Does not bruise/bleed easily.   Skin:  Negative for color change and rash.   Gastrointestinal:  Negative for abdominal pain, nausea and vomiting.   Genitourinary:  Negative for dysuria and hematuria.   Neurological:  Negative for numbness, sensory change and weakness.   Psychiatric/Behavioral:  Negative for altered mental status.       Current Outpatient Medications on File Prior to Visit   Medication Sig Dispense Refill    aspirin (ECOTRIN) 81 MG EC tablet Take 1 tablet (81 mg total) by mouth 2 (two) times a day. 60 tablet 0    [DISCONTINUED] methocarbamoL (ROBAXIN) 750 MG Tab Take 500 mg by mouth 4 (four) times daily.      [DISCONTINUED] oxyCODONE-acetaminophen (PERCOCET) 7.5-325 mg per tablet Take 1 tablet by mouth every 4 (four) hours as needed for Pain.  "      No current facility-administered medications on file prior to visit.          Objective:      /81   Pulse 93   Temp 97.8 °F (36.6 °C)   Ht 5' 5" (1.651 m)   Wt 58.1 kg (128 lb)   BMI 21.30 kg/m²   Physical Exam  Constitutional:       General: He is not in acute distress.     Appearance: Normal appearance.   HENT:      Head: Normocephalic and atraumatic.      Mouth/Throat:      Mouth: Mucous membranes are moist.   Eyes:      Extraocular Movements: Extraocular movements intact.   Cardiovascular:      Rate and Rhythm: Normal rate.      Pulses: Normal pulses.   Pulmonary:      Effort: Pulmonary effort is normal. No respiratory distress.   Abdominal:      General: There is no distension.      Palpations: Abdomen is soft.      Tenderness: There is no abdominal tenderness.   Musculoskeletal:      Cervical back: Normal range of motion and neck supple.      Comments: Left lower extremity: surgical incisions and traumatic wounds are well healed with no erythema, drainage, dehiscence. No painful or prominent hardware. Good passive ROM of the hip without pain. Mild swelling to thigh; compartments soft and compressible. Knee ROM 0-90 degrees. Mild swelling at the knee. No calf tenderness. HE has some soreness with palpation over the fibula. 5/5 strength distally with dorsiflexion/plantar flexion. Palpable DP pulse. BCR distally.    Neurological:      Mental Status: He is alert and oriented to person, place, and time. Mental status is at baseline.   Psychiatric:         Mood and Affect: Mood normal.         Behavior: Behavior normal.         Thought Content: Thought content normal.         Judgment: Judgment normal.      Body mass index is 21.3 kg/m².    Radiology: no new films        Assessment:         1. Closed displaced comminuted fracture of shaft of left femur, initial encounter        2. Closed nondisplaced transverse fracture of shaft of left fibula, initial encounter        3. Displaced longitudinal " fracture of left patella, initial encounter for open fracture type I or II                Plan:     Patient is doing well today 2.5 weeks out from IMN L femur fracture; I&D open left patella fracture; and non op treatment of left fibula fracture. Surgical incisions are well healed and free of any signs of infection. Staples were removed in the office. He may shower with antibacterial soap and water, pat dry, and leave open to air. He can continue to weight bear as tolerated to the left lower extremity in the cam boot. Ok to remove cam boot for sleep and showers. Continue crutches as needed for balance; ok to wean when ready. Work on ROM exercises to hip, knee, and ankle. Pain med and muscle relaxer were refilled. We discussed nicotine cessation and he understands the risks of non union associated with smoking. We will plan to see him back in 1 month for repeat xrays and evaluation. All questions and concerns were addressed. Patient and mother understand and agree with the plan.     The above findings, diagnosis, and treatment plan were discussed with Dr. Delgado Rasmussen who is in agreement.      Follow up in about 1 month (around 3/9/2023).    Closed displaced comminuted fracture of shaft of left femur, initial encounter    Closed nondisplaced transverse fracture of shaft of left fibula, initial encounter    Displaced longitudinal fracture of left patella, initial encounter for open fracture type I or II    Other orders  -     oxyCODONE-acetaminophen (PERCOCET) 7.5-325 mg per tablet; Take 1 tablet by mouth every 6 (six) hours as needed for Pain.  Dispense: 28 tablet; Refill: 0  -     methocarbamoL (ROBAXIN) 500 MG Tab; Take 1 tablet (500 mg total) by mouth 3 (three) times daily as needed (spasm).  Dispense: 21 tablet; Refill: 0         Medications Ordered This Encounter   Medications    methocarbamoL (ROBAXIN) 500 MG Tab     Sig: Take 1 tablet (500 mg total) by mouth 3 (three) times daily as needed (spasm).      Dispense:  21 tablet     Refill:  0    oxyCODONE-acetaminophen (PERCOCET) 7.5-325 mg per tablet     Sig: Take 1 tablet by mouth every 6 (six) hours as needed for Pain.     Dispense:  28 tablet     Refill:  0     Quantity prescribed more than 7 day supply? No     Order Specific Question:   I have reviewed the Prescription Drug Monitoring Program (PDMP) database for this patient prior to prescribing the above opioid medication     Answer:   Yes       No orders of the defined types were placed in this encounter.      Future Appointments   Date Time Provider Department Center   3/13/2023 10:30 AM Delgado Rasmussen MD [facility]

## 2023-02-22 RX ORDER — OXYCODONE AND ACETAMINOPHEN 7.5; 325 MG/1; MG/1
1 TABLET ORAL EVERY 8 HOURS PRN
Qty: 21 TABLET | Refills: 0 | Status: SHIPPED | OUTPATIENT
Start: 2023-02-22 | End: 2023-03-02 | Stop reason: SDUPTHER

## 2023-03-02 RX ORDER — METHOCARBAMOL 750 MG/1
750 TABLET, FILM COATED ORAL 3 TIMES DAILY PRN
Qty: 42 TABLET | Refills: 0 | Status: SHIPPED | OUTPATIENT
Start: 2023-03-02 | End: 2023-03-14 | Stop reason: SDUPTHER

## 2023-03-02 RX ORDER — OXYCODONE AND ACETAMINOPHEN 7.5; 325 MG/1; MG/1
1 TABLET ORAL EVERY 8 HOURS PRN
Qty: 21 TABLET | Refills: 0 | Status: SHIPPED | OUTPATIENT
Start: 2023-03-02 | End: 2023-03-09 | Stop reason: SDUPTHER

## 2023-03-09 DIAGNOSIS — S72.352A CLOSED DISPLACED COMMINUTED FRACTURE OF SHAFT OF LEFT FEMUR, INITIAL ENCOUNTER: Primary | ICD-10-CM

## 2023-03-09 RX ORDER — OXYCODONE AND ACETAMINOPHEN 7.5; 325 MG/1; MG/1
1 TABLET ORAL EVERY 8 HOURS PRN
Qty: 21 TABLET | Refills: 0 | Status: SHIPPED | OUTPATIENT
Start: 2023-03-09 | End: 2023-03-14 | Stop reason: SDUPTHER

## 2023-03-13 ENCOUNTER — OFFICE VISIT (OUTPATIENT)
Dept: ORTHOPEDICS | Facility: CLINIC | Age: 28
End: 2023-03-13
Payer: MEDICAID

## 2023-03-13 ENCOUNTER — HOSPITAL ENCOUNTER (OUTPATIENT)
Dept: RADIOLOGY | Facility: CLINIC | Age: 28
Discharge: HOME | End: 2023-03-13
Attending: ORTHOPAEDIC SURGERY
Payer: MEDICAID

## 2023-03-13 VITALS
WEIGHT: 128 LBS | BODY MASS INDEX: 21.33 KG/M2 | SYSTOLIC BLOOD PRESSURE: 116 MMHG | DIASTOLIC BLOOD PRESSURE: 70 MMHG | HEIGHT: 65 IN | TEMPERATURE: 98 F | HEART RATE: 78 BPM

## 2023-03-13 DIAGNOSIS — S72.352A CLOSED DISPLACED COMMINUTED FRACTURE OF SHAFT OF LEFT FEMUR, INITIAL ENCOUNTER: ICD-10-CM

## 2023-03-13 DIAGNOSIS — S82.425A CLOSED NONDISPLACED TRANSVERSE FRACTURE OF SHAFT OF LEFT FIBULA, INITIAL ENCOUNTER: ICD-10-CM

## 2023-03-13 DIAGNOSIS — M25.512 ACUTE PAIN OF LEFT SHOULDER: ICD-10-CM

## 2023-03-13 DIAGNOSIS — S72.352A CLOSED DISPLACED COMMINUTED FRACTURE OF SHAFT OF LEFT FEMUR, INITIAL ENCOUNTER: Primary | ICD-10-CM

## 2023-03-13 DIAGNOSIS — S82.022B: ICD-10-CM

## 2023-03-13 PROCEDURE — 3008F PR BODY MASS INDEX (BMI) DOCUMENTED: ICD-10-PCS | Mod: CPTII,,, | Performed by: NURSE PRACTITIONER

## 2023-03-13 PROCEDURE — 3074F PR MOST RECENT SYSTOLIC BLOOD PRESSURE < 130 MM HG: ICD-10-PCS | Mod: CPTII,,, | Performed by: NURSE PRACTITIONER

## 2023-03-13 PROCEDURE — 3078F PR MOST RECENT DIASTOLIC BLOOD PRESSURE < 80 MM HG: ICD-10-PCS | Mod: CPTII,,, | Performed by: NURSE PRACTITIONER

## 2023-03-13 PROCEDURE — 73560 XR KNEE 1 OR 2 VIEW LEFT: ICD-10-PCS | Mod: LT,,, | Performed by: ORTHOPAEDIC SURGERY

## 2023-03-13 PROCEDURE — 73560 X-RAY EXAM OF KNEE 1 OR 2: CPT | Mod: LT,,, | Performed by: ORTHOPAEDIC SURGERY

## 2023-03-13 PROCEDURE — 73590 XR TIBIA FIBULA 2 VIEW LEFT: ICD-10-PCS | Mod: LT,,, | Performed by: ORTHOPAEDIC SURGERY

## 2023-03-13 PROCEDURE — 3078F DIAST BP <80 MM HG: CPT | Mod: CPTII,,, | Performed by: NURSE PRACTITIONER

## 2023-03-13 PROCEDURE — 1160F RVW MEDS BY RX/DR IN RCRD: CPT | Mod: CPTII,,, | Performed by: NURSE PRACTITIONER

## 2023-03-13 PROCEDURE — 1159F PR MEDICATION LIST DOCUMENTED IN MEDICAL RECORD: ICD-10-PCS | Mod: CPTII,,, | Performed by: NURSE PRACTITIONER

## 2023-03-13 PROCEDURE — 99024 POSTOP FOLLOW-UP VISIT: CPT | Mod: ,,, | Performed by: NURSE PRACTITIONER

## 2023-03-13 PROCEDURE — 73552 XR FEMUR 2 VIEW LEFT: ICD-10-PCS | Mod: LT,,, | Performed by: ORTHOPAEDIC SURGERY

## 2023-03-13 PROCEDURE — 73590 X-RAY EXAM OF LOWER LEG: CPT | Mod: LT,,, | Performed by: ORTHOPAEDIC SURGERY

## 2023-03-13 PROCEDURE — 73552 X-RAY EXAM OF FEMUR 2/>: CPT | Mod: LT,,, | Performed by: ORTHOPAEDIC SURGERY

## 2023-03-13 PROCEDURE — 1159F MED LIST DOCD IN RCRD: CPT | Mod: CPTII,,, | Performed by: NURSE PRACTITIONER

## 2023-03-13 PROCEDURE — 3008F BODY MASS INDEX DOCD: CPT | Mod: CPTII,,, | Performed by: NURSE PRACTITIONER

## 2023-03-13 PROCEDURE — 1160F PR REVIEW ALL MEDS BY PRESCRIBER/CLIN PHARMACIST DOCUMENTED: ICD-10-PCS | Mod: CPTII,,, | Performed by: NURSE PRACTITIONER

## 2023-03-13 PROCEDURE — 3074F SYST BP LT 130 MM HG: CPT | Mod: CPTII,,, | Performed by: NURSE PRACTITIONER

## 2023-03-13 PROCEDURE — 99024 PR POST-OP FOLLOW-UP VISIT: ICD-10-PCS | Mod: ,,, | Performed by: NURSE PRACTITIONER
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
## 2023-03-15 ENCOUNTER — PATIENT MESSAGE (OUTPATIENT)
Dept: ORTHOPEDICS | Facility: CLINIC | Age: 28
End: 2023-03-15
Payer: COMMERCIAL

## 2023-03-21 DIAGNOSIS — S72.352A CLOSED DISPLACED COMMINUTED FRACTURE OF SHAFT OF LEFT FEMUR, INITIAL ENCOUNTER: ICD-10-CM

## 2023-03-21 RX ORDER — OXYCODONE AND ACETAMINOPHEN 7.5; 325 MG/1; MG/1
1 TABLET ORAL EVERY 12 HOURS PRN
Qty: 14 TABLET | Refills: 0 | Status: SHIPPED | OUTPATIENT
Start: 2023-03-21 | End: 2023-03-27 | Stop reason: SDUPTHER

## 2023-03-22 DIAGNOSIS — M25.512 ACUTE PAIN OF LEFT SHOULDER: Primary | ICD-10-CM

## 2023-03-27 DIAGNOSIS — S72.352A CLOSED DISPLACED COMMINUTED FRACTURE OF SHAFT OF LEFT FEMUR, INITIAL ENCOUNTER: ICD-10-CM

## 2023-03-27 RX ORDER — OXYCODONE AND ACETAMINOPHEN 7.5; 325 MG/1; MG/1
1 TABLET ORAL EVERY 12 HOURS PRN
Qty: 14 TABLET | Refills: 0 | Status: SHIPPED | OUTPATIENT
Start: 2023-03-27 | End: 2023-04-04 | Stop reason: SDUPTHER

## 2023-04-13 ENCOUNTER — HOSPITAL ENCOUNTER (OUTPATIENT)
Dept: RADIOLOGY | Facility: HOSPITAL | Age: 28
Discharge: HOME | End: 2023-04-13
Attending: FAMILY MEDICINE
Payer: COMMERCIAL

## 2023-04-13 ENCOUNTER — OFFICE VISIT (OUTPATIENT)
Dept: ORTHOPEDICS | Facility: CLINIC | Age: 28
End: 2023-04-13
Payer: COMMERCIAL

## 2023-04-13 VITALS
WEIGHT: 120.19 LBS | DIASTOLIC BLOOD PRESSURE: 82 MMHG | BODY MASS INDEX: 20.03 KG/M2 | SYSTOLIC BLOOD PRESSURE: 124 MMHG | HEIGHT: 65 IN | HEART RATE: 75 BPM

## 2023-04-13 DIAGNOSIS — S42.255A CLOSED NONDISPLACED FRACTURE OF GREATER TUBEROSITY OF LEFT HUMERUS, INITIAL ENCOUNTER: Primary | ICD-10-CM

## 2023-04-13 DIAGNOSIS — S42.255A CLOSED NONDISPLACED FRACTURE OF GREATER TUBEROSITY OF LEFT HUMERUS, INITIAL ENCOUNTER: ICD-10-CM

## 2023-04-13 DIAGNOSIS — M75.52 SUBACROMIAL BURSITIS OF LEFT SHOULDER JOINT: ICD-10-CM

## 2023-04-13 PROCEDURE — 99213 OFFICE O/P EST LOW 20 MIN: CPT | Mod: PBBFAC

## 2023-04-13 PROCEDURE — 73030 X-RAY EXAM OF SHOULDER: CPT | Mod: TC,LT
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
## 2023-04-14 NOTE — PROGRESS NOTES
Faculty Attestation: William Yang  was seen in [facility] Medicine Clinic. Discussed with Dr. Mayfield at the time of the visit. History of Present Illness, Physical Exam, and Assessment and Plan reviewed. Treatment plan is reasonable and appropriate. Compliance with treatment recommendations is important.  Radiology images independently reviewed and agree with radiologist interpretation.      Paradise Ashley MD  Family/Sports Medicine

## 2023-04-24 ENCOUNTER — OFFICE VISIT (OUTPATIENT)
Dept: ORTHOPEDICS | Facility: CLINIC | Age: 28
End: 2023-04-24
Payer: MEDICAID

## 2023-04-24 ENCOUNTER — HOSPITAL ENCOUNTER (OUTPATIENT)
Dept: RADIOLOGY | Facility: CLINIC | Age: 28
Discharge: HOME | End: 2023-04-24
Attending: ORTHOPAEDIC SURGERY
Payer: MEDICAID

## 2023-04-24 VITALS
BODY MASS INDEX: 19.99 KG/M2 | TEMPERATURE: 87 F | DIASTOLIC BLOOD PRESSURE: 87 MMHG | HEART RATE: 97 BPM | HEIGHT: 65 IN | WEIGHT: 120 LBS | SYSTOLIC BLOOD PRESSURE: 139 MMHG

## 2023-04-24 DIAGNOSIS — S82.022D CLOSED DISPLACED LONGITUDINAL FRACTURE OF LEFT PATELLA WITH ROUTINE HEALING, SUBSEQUENT ENCOUNTER: ICD-10-CM

## 2023-04-24 DIAGNOSIS — S72.352D CLOSED DISPLACED COMMINUTED FRACTURE OF SHAFT OF LEFT FEMUR WITH ROUTINE HEALING, SUBSEQUENT ENCOUNTER: ICD-10-CM

## 2023-04-24 DIAGNOSIS — S72.352D CLOSED DISPLACED COMMINUTED FRACTURE OF SHAFT OF LEFT FEMUR WITH ROUTINE HEALING, SUBSEQUENT ENCOUNTER: Primary | ICD-10-CM

## 2023-04-24 PROCEDURE — 3079F DIAST BP 80-89 MM HG: CPT | Mod: CPTII,,, | Performed by: NURSE PRACTITIONER

## 2023-04-24 PROCEDURE — 73552 XR FEMUR 2 VIEW LEFT: ICD-10-PCS | Mod: LT,,, | Performed by: ORTHOPAEDIC SURGERY

## 2023-04-24 PROCEDURE — 3008F PR BODY MASS INDEX (BMI) DOCUMENTED: ICD-10-PCS | Mod: CPTII,,, | Performed by: NURSE PRACTITIONER

## 2023-04-24 PROCEDURE — 73552 X-RAY EXAM OF FEMUR 2/>: CPT | Mod: LT,,, | Performed by: ORTHOPAEDIC SURGERY

## 2023-04-24 PROCEDURE — 1160F RVW MEDS BY RX/DR IN RCRD: CPT | Mod: CPTII,,, | Performed by: NURSE PRACTITIONER

## 2023-04-24 PROCEDURE — 73560 X-RAY EXAM OF KNEE 1 OR 2: CPT | Mod: LT,,, | Performed by: ORTHOPAEDIC SURGERY

## 2023-04-24 PROCEDURE — 73560 XR KNEE 1 OR 2 VIEW LEFT: ICD-10-PCS | Mod: LT,,, | Performed by: ORTHOPAEDIC SURGERY

## 2023-04-24 PROCEDURE — 1159F PR MEDICATION LIST DOCUMENTED IN MEDICAL RECORD: ICD-10-PCS | Mod: CPTII,,, | Performed by: NURSE PRACTITIONER

## 2023-04-24 PROCEDURE — 3075F SYST BP GE 130 - 139MM HG: CPT | Mod: CPTII,,, | Performed by: NURSE PRACTITIONER

## 2023-04-24 PROCEDURE — 3079F PR MOST RECENT DIASTOLIC BLOOD PRESSURE 80-89 MM HG: ICD-10-PCS | Mod: CPTII,,, | Performed by: NURSE PRACTITIONER

## 2023-04-24 PROCEDURE — 3008F BODY MASS INDEX DOCD: CPT | Mod: CPTII,,, | Performed by: NURSE PRACTITIONER

## 2023-04-24 PROCEDURE — 1160F PR REVIEW ALL MEDS BY PRESCRIBER/CLIN PHARMACIST DOCUMENTED: ICD-10-PCS | Mod: CPTII,,, | Performed by: NURSE PRACTITIONER

## 2023-04-24 PROCEDURE — 99212 PR OFFICE/OUTPT VISIT, EST, LEVL II, 10-19 MIN: ICD-10-PCS | Mod: ,,, | Performed by: NURSE PRACTITIONER

## 2023-04-24 PROCEDURE — 1159F MED LIST DOCD IN RCRD: CPT | Mod: CPTII,,, | Performed by: NURSE PRACTITIONER

## 2023-04-24 PROCEDURE — 3075F PR MOST RECENT SYSTOLIC BLOOD PRESS GE 130-139MM HG: ICD-10-PCS | Mod: CPTII,,, | Performed by: NURSE PRACTITIONER

## 2023-04-24 PROCEDURE — 99212 OFFICE O/P EST SF 10 MIN: CPT | Mod: ,,, | Performed by: NURSE PRACTITIONER
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
## (undated) DEVICE — BIT DRILL T2 4.2 X 180MM L

## (undated) DEVICE — BOOT WALKER ROCKER MEDIUM

## (undated) DEVICE — COVER PROXIMA MAYO STAND

## (undated) DEVICE — ELECTRODE REM POLYHESIVE II

## (undated) DEVICE — DRAPE STERI U-SHAPED 47X51IN

## (undated) DEVICE — DRESSING GAUZE XEROFORM 5X9

## (undated) DEVICE — GLOVE PROTEXIS LTX MICRO 8

## (undated) DEVICE — Device

## (undated) DEVICE — SUT VICRYL BR 1 GEN 27 CT-1

## (undated) DEVICE — GUIDE WIRE 3.0X1000MM BALL TIP
Type: IMPLANTABLE DEVICE | Site: FEMUR | Status: NON-FUNCTIONAL
Removed: 2023-01-21

## (undated) DEVICE — BLADE SURG CARBON STEEL #10

## (undated) DEVICE — KWIRE RECON DRL TIP 3.2X400MM
Type: IMPLANTABLE DEVICE | Site: FEMUR | Status: NON-FUNCTIONAL
Removed: 2023-01-21

## (undated) DEVICE — TAPE SILK 3IN

## (undated) DEVICE — STAPLER SKIN PROXIMATE WIDE

## (undated) DEVICE — GOWN SMARTSLEEVE AAMI LVL4 XL

## (undated) DEVICE — DRAPE TOP 53X102IN

## (undated) DEVICE — DRAPE C-ARMOR EQUIPMENT COVER

## (undated) DEVICE — COVER TABLE HVY DTY 60X90IN

## (undated) DEVICE — DRILL T2 ALPHA LOK 4.2X360MM

## (undated) DEVICE — GLOVE 8 PROTEXIS PI ORTHO

## (undated) DEVICE — SPONGE GAUZE 4X4 12 PLY STRL

## (undated) DEVICE — SOL NACL STRL BOTTLE 1000ML

## (undated) DEVICE — PADDING WYTEX UNDRCST 6INX4YD

## (undated) DEVICE — DRESSING XEROFORM 5X9IN

## (undated) DEVICE — DECANTER BAG FLUID TRANSFER

## (undated) DEVICE — GLOVE PROTEXIS HYDROGEL SZ6.5

## (undated) DEVICE — APPLICATOR CHLORAPREP ORN 26ML

## (undated) DEVICE — DRAPE ORTH SPLIT 77X108IN

## (undated) DEVICE — COVER FULLGUARD SHOE HIGH-TOP

## (undated) DEVICE — GLOVE PROTEXIS BLUE LATEX 7

## (undated) DEVICE — XPERIENCE

## (undated) DEVICE — GLOVE PROTEXIS PI CRM 7

## (undated) DEVICE — KWIRE FIXATION STRL 3.2X450MM
Type: IMPLANTABLE DEVICE | Site: FEMUR | Status: NON-FUNCTIONAL
Removed: 2023-01-21

## (undated) DEVICE — REAMER MOD BIXCUT 8X48MM STER.

## (undated) DEVICE — SUT PDS PLUS MONO 1 CT 36IN

## (undated) DEVICE — DRAPE IOBAN 2 STERI

## (undated) DEVICE — GAUZE SPONGE 4X4 12PLY

## (undated) DEVICE — BANDAGE PRCAR COMPR 11YDX6IN